# Patient Record
Sex: FEMALE | Race: WHITE | NOT HISPANIC OR LATINO | Employment: PART TIME | URBAN - METROPOLITAN AREA
[De-identification: names, ages, dates, MRNs, and addresses within clinical notes are randomized per-mention and may not be internally consistent; named-entity substitution may affect disease eponyms.]

---

## 2017-05-22 ENCOUNTER — ALLSCRIPTS OFFICE VISIT (OUTPATIENT)
Dept: OTHER | Facility: OTHER | Age: 22
End: 2017-05-22

## 2017-05-22 ENCOUNTER — LAB REQUISITION (OUTPATIENT)
Dept: LAB | Facility: HOSPITAL | Age: 22
End: 2017-05-22
Payer: COMMERCIAL

## 2017-05-22 DIAGNOSIS — N91.5 OLIGOMENORRHEA: ICD-10-CM

## 2017-05-22 DIAGNOSIS — Z11.3 ENCOUNTER FOR SCREENING FOR INFECTIONS WITH PREDOMINANTLY SEXUAL MODE OF TRANSMISSION: ICD-10-CM

## 2017-05-22 DIAGNOSIS — Z01.419 ENCOUNTER FOR GYNECOLOGICAL EXAMINATION WITHOUT ABNORMAL FINDING: ICD-10-CM

## 2017-05-22 PROCEDURE — 87591 N.GONORRHOEAE DNA AMP PROB: CPT | Performed by: OBSTETRICS & GYNECOLOGY

## 2017-05-22 PROCEDURE — 87491 CHLMYD TRACH DNA AMP PROBE: CPT | Performed by: OBSTETRICS & GYNECOLOGY

## 2017-05-22 PROCEDURE — G0143 SCR C/V CYTO,THINLAYER,RESCR: HCPCS | Performed by: OBSTETRICS & GYNECOLOGY

## 2017-05-22 PROCEDURE — 87661 TRICHOMONAS VAGINALIS AMPLIF: CPT | Performed by: OBSTETRICS & GYNECOLOGY

## 2017-05-30 LAB
CHLAMYDIA DNA CVX QL NAA+PROBE: NORMAL
N GONORRHOEA DNA GENITAL QL NAA+PROBE: NORMAL

## 2017-06-02 LAB
LAB AP GYN PRIMARY INTERPRETATION: NORMAL
Lab: NORMAL

## 2017-06-09 LAB — MISCELLANEOUS LAB TEST RESULT: NORMAL

## 2018-01-13 VITALS
DIASTOLIC BLOOD PRESSURE: 78 MMHG | HEIGHT: 66 IN | BODY MASS INDEX: 47.09 KG/M2 | WEIGHT: 293 LBS | SYSTOLIC BLOOD PRESSURE: 120 MMHG

## 2018-10-20 ENCOUNTER — HOSPITAL ENCOUNTER (EMERGENCY)
Facility: HOSPITAL | Age: 23
Discharge: HOME | End: 2018-10-20
Attending: EMERGENCY MEDICINE | Admitting: EMERGENCY MEDICINE
Payer: COMMERCIAL

## 2018-10-20 VITALS
RESPIRATION RATE: 20 BRPM | OXYGEN SATURATION: 98 % | HEART RATE: 55 BPM | BODY MASS INDEX: 48.82 KG/M2 | TEMPERATURE: 98.6 F | HEIGHT: 65 IN | DIASTOLIC BLOOD PRESSURE: 94 MMHG | WEIGHT: 293 LBS | SYSTOLIC BLOOD PRESSURE: 142 MMHG

## 2018-10-20 DIAGNOSIS — S16.1XXA STRAIN OF NECK MUSCLE, INITIAL ENCOUNTER: Primary | ICD-10-CM

## 2018-10-20 LAB — EXT PREG TEST URINE: NEGATIVE

## 2018-10-20 PROCEDURE — 81025 URINE PREGNANCY TEST: CPT | Performed by: PHYSICIAN ASSISTANT

## 2018-10-20 PROCEDURE — 99283 EMERGENCY DEPT VISIT LOW MDM: CPT

## 2018-10-20 RX ORDER — METHOCARBAMOL 500 MG/1
TABLET, FILM COATED ORAL
Qty: 20 TABLET | Refills: 0 | Status: SHIPPED | OUTPATIENT
Start: 2018-10-20 | End: 2019-10-24 | Stop reason: ALTCHOICE

## 2018-10-20 RX ORDER — METHOCARBAMOL 500 MG/1
750 TABLET, FILM COATED ORAL ONCE
Status: COMPLETED | OUTPATIENT
Start: 2018-10-20 | End: 2018-10-20

## 2018-10-20 RX ORDER — NAPROXEN 500 MG/1
500 TABLET ORAL 2 TIMES DAILY WITH MEALS
Qty: 20 TABLET | Refills: 0 | Status: SHIPPED | OUTPATIENT
Start: 2018-10-20 | End: 2019-10-24 | Stop reason: ALTCHOICE

## 2018-10-20 RX ORDER — LEVOTHYROXINE SODIUM 0.12 MG/1
TABLET ORAL
COMMUNITY
End: 2020-08-08

## 2018-10-20 RX ORDER — LIDOCAINE 50 MG/G
1 PATCH TOPICAL ONCE
Status: DISCONTINUED | OUTPATIENT
Start: 2018-10-20 | End: 2018-10-20 | Stop reason: HOSPADM

## 2018-10-20 RX ORDER — NAPROXEN 250 MG/1
500 TABLET ORAL ONCE
Status: COMPLETED | OUTPATIENT
Start: 2018-10-20 | End: 2018-10-20

## 2018-10-20 RX ADMIN — METHOCARBAMOL 750 MG: 500 TABLET ORAL at 14:07

## 2018-10-20 RX ADMIN — LIDOCAINE 1 PATCH: 50 PATCH TOPICAL at 14:05

## 2018-10-20 RX ADMIN — NAPROXEN 500 MG: 250 TABLET ORAL at 14:05

## 2018-10-20 NOTE — DISCHARGE INSTRUCTIONS
Cervical Strain   WHAT YOU NEED TO KNOW:   A cervical strain is a stretched or torn muscle or tendon in your neck  Tendons are strong tissues that connect muscles to bones  Common causes of cervical strains include a car accident, a fall, or a sports injury  DISCHARGE INSTRUCTIONS:   Return to the emergency department if:   · You have pain or numbness from your shoulder down to your hand  · You have problems with your vision, hearing, or balance  · You feel confused or cannot concentrate  · You have problems with movement and strength  Contact your healthcare provider if:   · You have increased swelling or pain in your neck  · You have questions or concerns about your condition or care  Medicines: You may need any of the following:  · Acetaminophen  decreases pain and fever  It is available without a doctor's order  Ask how much to take and how often to take it  Follow directions  Read the labels of all other medicines you are using to see if they also contain acetaminophen, or ask your doctor or pharmacist  Acetaminophen can cause liver damage if not taken correctly  Do not use more than 4 grams (4,000 milligrams) total of acetaminophen in one day  · NSAIDs , such as ibuprofen, help decrease swelling, pain, and fever  This medicine is available with or without a doctor's order  NSAIDs can cause stomach bleeding or kidney problems in certain people  If you take blood thinner medicine, always ask your healthcare provider if NSAIDs are safe for you  Always read the medicine label and follow directions  · Muscle relaxers  help decrease pain and muscle spasms  · Prescription pain medicine  may be given  Ask your healthcare provider how to take this medicine safely  Some prescription pain medicines contain acetaminophen  Do not take other medicines that contain acetaminophen without talking to your healthcare provider  Too much acetaminophen may cause liver damage   Prescription pain medicine may cause constipation  Ask your healthcare provider how to prevent or treat constipation  · Take your medicine as directed  Contact your healthcare provider if you think your medicine is not helping or if you have side effects  Tell him or her if you are allergic to any medicine  Keep a list of the medicines, vitamins, and herbs you take  Include the amounts, and when and why you take them  Bring the list or the pill bottles to follow-up visits  Carry your medicine list with you in case of an emergency  Manage your symptoms:   · Apply heat  on your neck for 15 to 20 minutes, 4 to 6 times a day or as directed  Heat helps decrease pain, stiffness, and muscle spasms  · Begin gentle neck exercises  as soon as you can move your neck without pain  Exercises will help decrease stiffness and improve the strength and movement of your neck  Ask your healthcare provider what kind of exercises you should do  · Gradually return to your usual activities as directed  Stop if you have pain  Avoid activities that can cause more damage to your neck, such as heavy lifting or strenuous exercise  · Sleep without a pillow  to help decrease pain  Instead, roll a small towel tightly and place it under your neck  · Go to physical therapy as directed  A physical therapist teaches you exercises to help improve movement and strength, and to decrease pain  Prevent neck injury:   · Drive safely  Make sure everyone in your car wears a seatbelt  A seatbelt can save your life if you are in an accident  Do not use your cell phone when you are driving  This could distract you and cause an accident  Pull over if you need to make a call or send a text message  · Wear helmets, lifejackets, and protective gear  Always wear a helmet when you ride a bike or motorcycle, go skiing, or play sports that could cause a head injury  Wear protective equipment when you play sports   Wear a lifejacket when you are on a boat or doing water sports  Follow up with your healthcare provider as directed: You may be referred to an orthopedist or physical therapies  Write down your questions so you remember to ask them during your visits  © 2017 [employer] Information is for End User's use only and may not be sold, redistributed or otherwise used for commercial purposes  All illustrations and images included in CareNotes® are the copyrighted property of [employer]  or [employer]  The above information is an  only  It is not intended as medical advice for individual conditions or treatments  Talk to your doctor, nurse or pharmacist before following any medical regimen to see if it is safe and effective for you  Muscle Strain   WHAT YOU NEED TO KNOW:   A muscle strain is a twist, pull, or tear of a muscle or tendon  A tendon is a strong elastic tissue that connects a muscle to a bone  Signs of a strained muscle include bruising and swelling over the area, pain with movement, and loss of strength  DISCHARGE INSTRUCTIONS:   Return to the emergency department if:   · You suddenly cannot feel or move your injured muscle  Contact your healthcare provider if:   · Your pain and swelling worsen or do not go away  · You have questions or concerns about your condition or care  Medicines:   · NSAIDs  help decrease swelling and pain or fever  This medicine is available with or without a doctor's order  NSAIDs can cause stomach bleeding or kidney problems in certain people  If you take blood thinner medicine, always ask your healthcare provider if NSAIDs are safe for you  Always read the medicine label and follow directions  · Muscle relaxers  help decrease pain and muscle spasms  · Take your medicine as directed  Contact your healthcare provider if you think your medicine is not helping or if you have side effects  Tell him of her if you are allergic to any medicine   Keep a list of the medicines, vitamins, and herbs you take  Include the amounts, and when and why you take them  Bring the list or the pill bottles to follow-up visits  Carry your medicine list with you in case of an emergency  Follow up with your healthcare provider as directed: Your healthcare provider may suggest that you have a follow-up visit before you go back to your usual activity  Write down your questions so you remember to ask them during your visits  Self-care:   · 3 to 7 days after the injury:  Use Rest, Ice, Compression, and Elevation (RICE) to help stop bruising and decrease pain and swelling  ¨ Rest:  Rest your muscle to allow your injury to heal  When the pain decreases, begin normal, slow movements  For mild and moderate muscle strains, you should rest your muscles for about 2 days  However, if you have a severe muscle strain, you should rest for 10 to 14 days  You may need to use crutches to walk if your muscle strain is in your legs or lower body  ¨ Ice:  Put an ice pack on the injured area  Put a towel between the ice pack and your skin  Do not put the ice pack directly on your skin  You can use a package of frozen peas instead of an ice pack  ¨ Compression:  You may need to wrap an elastic bandage around the area to decrease swelling  It should be tight enough for you to feel support  Do not wrap it too tightly  ¨ Elevation:  Keep the injured muscle raised above your heart if possible  For example if you have a strain of your lower leg muscle, lie down and prop your leg up on pillows  This helps decrease pain and swelling  · 3 to 21 days after the injury:  Start to slowly and regularly exercise your muscle  This will help it heal  If you feel pain, decrease how hard you are exercising  · 1 to 6 weeks after the injury:  Stretch the injured muscle  Hold the stretch for about 30 seconds  Do this 4 times a day  You may stretch the muscle until you feel a slight pull   Stop stretching if you feel pain  · 2 weeks to 6 months after the injury:  The goal of this phase is to return to the activity you were doing before the injury happened, without hurting the muscle again  · 3 weeks to 6 months after the injury:  Keep stretching and strengthening your muscles to avoid injury  Slowly increase the time and distance that you exercise  You may have signs and symptoms of muscle strain 6 months after the injury, even if you do things to help it heal  In this case, you may need surgery on the muscle  © 2017 [employer]  Information is for End User's use only and may not be sold, redistributed or otherwise used for commercial purposes  All illustrations and images included in CareNotes® are the copyrighted property of [employer]  or [employer]  The above information is an  only  It is not intended as medical advice for individual conditions or treatments  Talk to your doctor, nurse or pharmacist before following any medical regimen to see if it is safe and effective for you

## 2018-10-20 NOTE — ED PROVIDER NOTES
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 Taking?   levothyroxine 125 mcg tablet   Yes No   Sig: Take by mouth      Facility-Administered Medications: None       Past Medical History:   Diagnosis Date    Disease of thyroid gland        History reviewed  No pertinent surgical history  History reviewed  No pertinent family history  I have reviewed and agree with the history as documented  Social History   Substance Use Topics    Smoking status: Never Smoker    Smokeless tobacco: Never Used    Alcohol use No        Review of Systems   Constitutional: Negative for chills and fever  HENT: Negative for congestion, rhinorrhea, sinus pain and sore throat  Respiratory: Negative for cough, shortness of breath and wheezing  Cardiovascular: Negative for chest pain  Gastrointestinal: Negative for abdominal pain, diarrhea, nausea and vomiting  Musculoskeletal: Positive for back pain and neck pain  Negative for arthralgias and myalgias  Neurological: Negative for dizziness and headaches  All other systems reviewed and are negative  Physical Exam  Physical Exam   Constitutional: She is oriented to person, place, and time  She appears well-developed and well-nourished  No distress  HENT:   Head: Normocephalic and atraumatic  Right Ear: Hearing, tympanic membrane, external ear and ear canal normal    Left Ear: Hearing, tympanic membrane, external ear and ear canal normal    Nose: Nose normal  No mucosal edema or rhinorrhea  Right sinus exhibits no maxillary sinus tenderness  Left sinus exhibits no maxillary sinus tenderness  Mouth/Throat: Uvula is midline, oropharynx is clear and moist and mucous membranes are normal  No oropharyngeal exudate  Eyes: Pupils are equal, round, and reactive to light  Conjunctivae, EOM and lids are normal    Neck: Normal range of motion  Neck supple  Muscular tenderness present  No spinous process tenderness present  No neck rigidity  No edema, no erythema and normal range of motion present  No Brudzinski's sign noted  No cervical mid-line TTP, +left cervical paraspinal & trapezius muscle TTP   Cardiovascular: Normal rate, regular rhythm and normal heart sounds  Pulmonary/Chest: Effort normal and breath sounds normal    Abdominal: Soft  Normal appearance and bowel sounds are normal  There is no tenderness  Musculoskeletal:   Normal gait and station  Non-focal with FROM upper, lower extremities, neck, chest and back   Lymphadenopathy:     She has no cervical adenopathy  Neurological: She is alert and oriented to person, place, and time  Appropriate speech and behavior   Non-focal  No sensory deficits noted, no motor deficits noted       Vital Signs  ED Triage Vitals   Temperature Pulse Respirations Blood Pressure SpO2   10/20/18 1344 10/20/18 1326 10/20/18 1326 10/20/18 1326 10/20/18 1326   98 6 °F (37 °C) 63 18 (!) 197/122 97 %      Temp src Heart Rate Source Patient Position - Orthostatic VS BP Location FiO2 (%)   -- 10/20/18 1326 -- -- --    Monitor         Pain Score       --                  Vitals:    10/20/18 1326 10/20/18 1357   BP: (!) 197/122 142/94   Pulse: 63 55       Visual Acuity      ED Medications  Medications   lidocaine (LIDODERM) 5 % patch 1 patch (1 patch Topical Medication Applied 10/20/18 1405)   naproxen (NAPROSYN) tablet 500 mg (500 mg Oral Given 10/20/18 1405)   methocarbamol (ROBAXIN) tablet 750 mg (750 mg Oral Given 10/20/18 1407)       Diagnostic Studies  Results Reviewed     Procedure Component Value Units Date/Time    POCT pregnancy, urine [[id number]]  (Normal) Resulted:  10/20/18 1406    Lab Status:  Final result Updated:  10/20/18 1406     EXT PREG TEST UR (Ref: Negative) negative                 No orders to display              Procedures  Procedures       Phone Contacts  ED Phone Contact    ED Course                               MDM  CritCare Time    Disposition  Final diagnoses:   Strain of neck muscle, initial encounter     Time reflects when diagnosis was documented in both MDM as applicable and the Disposition within this note     Time User Action Codes Description Comment    10/20/2018  2:02 PM Kathryn Wilsoner  1XXA] Strain of neck muscle, initial encounter       ED Disposition     ED Disposition Condition Comment    Discharge  Sean Born discharge to home/self care  Condition at discharge: Good        Follow-up Information     Follow up With Specialties Details Why Contact Delicia Christian DO General Practice In 1 week  [address]  [facility] [address]  [phone]            Patient's Medications   Discharge Prescriptions    METHOCARBAMOL (ROBAXIN) 500 MG TABLET    Take 1-2 tablets twice daily as needed for muscle spasm  May cause sedation  Do not drive while taking  Start Date: 10/20/2018End Date: --       Order Dose: --       Quantity: 20 tablet    Refills: 0    NAPROXEN (NAPROSYN) 500 MG TABLET    Take 1 tablet (500 mg total) by mouth 2 (two) times a day with meals       Start Date: 10/20/2018End Date: --       Order Dose: 500 mg       Quantity: 20 tablet    Refills: 0     No discharge procedures on file      ED Provider  Electronically Signed by           Modesta Gore PA-C  10/20/18 7067

## 2019-01-01 ENCOUNTER — APPOINTMENT (EMERGENCY)
Dept: RADIOLOGY | Facility: HOSPITAL | Age: 24
End: 2019-01-01
Payer: COMMERCIAL

## 2019-01-01 ENCOUNTER — HOSPITAL ENCOUNTER (EMERGENCY)
Facility: HOSPITAL | Age: 24
Discharge: HOME | End: 2019-01-01
Attending: EMERGENCY MEDICINE | Admitting: EMERGENCY MEDICINE
Payer: COMMERCIAL

## 2019-01-01 VITALS
HEIGHT: 65 IN | TEMPERATURE: 98.9 F | OXYGEN SATURATION: 100 % | WEIGHT: 293 LBS | BODY MASS INDEX: 48.82 KG/M2 | RESPIRATION RATE: 20 BRPM | SYSTOLIC BLOOD PRESSURE: 144 MMHG | HEART RATE: 99 BPM | DIASTOLIC BLOOD PRESSURE: 90 MMHG

## 2019-01-01 DIAGNOSIS — J32.9 SINUSITIS: ICD-10-CM

## 2019-01-01 DIAGNOSIS — R05.9 COUGH: ICD-10-CM

## 2019-01-01 DIAGNOSIS — J06.9 UPPER RESPIRATORY TRACT INFECTION, UNSPECIFIED TYPE: Primary | ICD-10-CM

## 2019-01-01 LAB — S PYO AG THROAT QL: NEGATIVE

## 2019-01-01 PROCEDURE — 99283 EMERGENCY DEPT VISIT LOW MDM: CPT

## 2019-01-01 PROCEDURE — 87430 STREP A AG IA: CPT | Performed by: PHYSICIAN ASSISTANT

## 2019-01-01 PROCEDURE — 87631 RESP VIRUS 3-5 TARGETS: CPT | Performed by: PHYSICIAN ASSISTANT

## 2019-01-01 PROCEDURE — 71046 X-RAY EXAM CHEST 2 VIEWS: CPT

## 2019-01-01 RX ORDER — IBUPROFEN 400 MG/1
400 TABLET ORAL EVERY 6 HOURS PRN
Qty: 20 TABLET | Refills: 0 | Status: SHIPPED | OUTPATIENT
Start: 2019-01-01 | End: 2020-08-04

## 2019-01-01 RX ORDER — GUAIFENESIN 100 MG/5ML
200 SYRUP ORAL 3 TIMES DAILY PRN
Qty: 120 ML | Refills: 0 | Status: SHIPPED | OUTPATIENT
Start: 2019-01-01 | End: 2019-01-08

## 2019-01-01 RX ORDER — OXYMETAZOLINE HYDROCHLORIDE 0.05 G/100ML
2 SPRAY NASAL ONCE
Status: COMPLETED | OUTPATIENT
Start: 2019-01-01 | End: 2019-01-01

## 2019-01-01 RX ORDER — IBUPROFEN 400 MG/1
400 TABLET ORAL ONCE
Status: COMPLETED | OUTPATIENT
Start: 2019-01-01 | End: 2019-01-01

## 2019-01-01 RX ADMIN — IBUPROFEN 400 MG: 400 TABLET ORAL at 21:40

## 2019-01-01 RX ADMIN — OXYMETAZOLINE HYDROCHLORIDE 2 SPRAY: 0.05 SPRAY NASAL at 22:47

## 2019-01-02 LAB
FLUAV AG SPEC QL: ABNORMAL
FLUBV AG SPEC QL: ABNORMAL
RSV B RNA SPEC QL NAA+PROBE: DETECTED

## 2019-01-02 NOTE — ED PROVIDER NOTES
History  Chief Complaint   Patient presents with    Flu Symptoms     Patient reports headaches, cough, fatigue, dizziness, fevers, for the past several days  The patient is a 40-year-old female presents emergency department with worsening intermittent hacking productive cough with yellow sputum  Patient states that sputum does not contain blood  Patient has associated symptoms of myalgias and frontal, maxillary congestion, and subjective fevers  Patient states that she was recently seen at an urgent care on Monday and was diagnosed with upper respiratory infection and discharged home care with promethazine and albuterol inhaler  Patient states that she works at [employer] and her coworkers are experiencing symptomatology similar that of her current presentation  Patient denies palliative in provocative factors  Patient denies ineffective treatment  Patient denies vomiting  Patient states she has mild nausea  Patient denies diarrhea, constipation, and urinary symptoms  Patient denies new contact with animals, plants, commercial, and industrial products  Patient affirms sick contacts with no recent travel  Patient denies headache, tinnitus, vision changes, meningeal, and vertiginous symptoms  Patient appetite below baseline  Patient states that she consumes copious amounts of water daily  Patient denies chest pain, shortness of breath, and abdominal pain  Patient is not in acute distress  Patient is compliant for physical examination  Patient has bilateral frontal and maxillary tenderness with bilateral nasal congestion  Patient mucous membranes moist with clear and patent oropharynx  Patient lung fields clear bilaterally with no wheezes, rales, or rhonchi  Patient benign neuro muscular exam     Will perform rapid flu and rapid strep  Will perform chest x-ray    Will deliver Afrin in the ED    Differential diagnosis covers but is not limited to:  Pneumonia  Bronchitis  Seasonal allergies  Influenza  Sinusitis    History provided by:  Patient   used: No    Cough   Cough characteristics:  Hacking  Sputum characteristics:  Yellow  Severity:  Mild  Onset quality:  Gradual  Duration:  4 days  Timing:  Intermittent  Progression:  Worsening  Smoker: no    Context: sick contacts    Context: not animal exposure, not exposure to allergens, not upper respiratory infection, not weather changes and not with activity    Relieved by:  Cough suppressants  Worsened by:  Lying down  Ineffective treatments:  None tried  Associated symptoms: chills, fever, myalgias and sinus congestion    Associated symptoms: no chest pain, no diaphoresis, no ear fullness, no ear pain, no eye discharge, no headaches, no rash, no rhinorrhea, no shortness of breath, no sore throat, no weight loss and no wheezing    Fever:     Duration:  4 days    Timing:  Intermittent    Temp source:  Subjective    Progression:  Worsening  Myalgias:     Location:  Generalized    Severity:  Mild    Onset quality:  Gradual    Duration:  4 days    Timing:  Intermittent    Progression:  Unchanged  Risk factors: recent infection    Risk factors: no chemical exposure and no recent travel        Prior to Admission Medications   Prescriptions Last Dose Informant Patient Reported? Taking?   levothyroxine 125 mcg tablet   Yes No   Sig: Take by mouth   methocarbamol (ROBAXIN) 500 mg tablet   No No   Sig: Take 1-2 tablets twice daily as needed for muscle spasm  May cause sedation  Do not drive while taking  naproxen (NAPROSYN) 500 mg tablet   No No   Sig: Take 1 tablet (500 mg total) by mouth 2 (two) times a day with meals      Facility-Administered Medications: None       Past Medical History:   Diagnosis Date    Disease of thyroid gland        History reviewed  No pertinent surgical history  History reviewed  No pertinent family history  I have reviewed and agree with the history as documented      Social History   Substance Use Topics    Smoking status: Never Smoker    Smokeless tobacco: Never Used    Alcohol use No        Review of Systems   Constitutional: Positive for chills and fever  Negative for diaphoresis and weight loss  HENT: Negative for ear pain, rhinorrhea and sore throat  Eyes: Negative for discharge  Respiratory: Positive for cough  Negative for chest tightness, shortness of breath and wheezing  Cardiovascular: Negative for chest pain and palpitations  Gastrointestinal: Negative for abdominal pain, constipation, diarrhea, nausea and vomiting  Genitourinary: Negative for difficulty urinating, dysuria and frequency  Musculoskeletal: Positive for myalgias  Negative for back pain and gait problem  Skin: Negative for pallor and rash  Allergic/Immunologic: Negative for environmental allergies and food allergies  Neurological: Negative for dizziness and headaches  Psychiatric/Behavioral: Negative for confusion  All other systems reviewed and are negative  Physical Exam  Physical Exam   Constitutional: She is oriented to person, place, and time  She appears well-developed and well-nourished  She is active and cooperative  Non-toxic appearance  She does not have a sickly appearance  She does not appear ill  No distress  HENT:   Head: Normocephalic and atraumatic  Right Ear: Hearing and external ear normal  No drainage, swelling or tenderness  No mastoid tenderness  No decreased hearing is noted  Left Ear: Hearing and external ear normal  No drainage, swelling or tenderness  No mastoid tenderness  No decreased hearing is noted  Nose: Nose normal  Right sinus exhibits no maxillary sinus tenderness and no frontal sinus tenderness  Left sinus exhibits no maxillary sinus tenderness and no frontal sinus tenderness     Mouth/Throat: Uvula is midline, oropharynx is clear and moist and mucous membranes are normal    Patient bilateral ear canal completely occluded with yellow cerumen   Eyes: Pupils are equal, round, and reactive to light  Conjunctivae, EOM and lids are normal  Right eye exhibits no discharge  Left eye exhibits no discharge  Neck: Trachea normal, normal range of motion, full passive range of motion without pain and phonation normal  Neck supple  No JVD present  No tracheal tenderness, no spinous process tenderness and no muscular tenderness present  No neck rigidity  No tracheal deviation and normal range of motion present  Cardiovascular: Normal rate, regular rhythm, normal heart sounds, intact distal pulses and normal pulses  Pulses:       Radial pulses are 2+ on the right side, and 2+ on the left side  Posterior tibial pulses are 2+ on the right side, and 2+ on the left side  Pulmonary/Chest: Effort normal and breath sounds normal  No stridor  She has no decreased breath sounds  She has no wheezes  She has no rhonchi  She has no rales  She exhibits no tenderness, no bony tenderness and no crepitus  Abdominal: Soft  Normal appearance and bowel sounds are normal  She exhibits no distension  There is no tenderness  There is no rigidity, no rebound, no guarding and no CVA tenderness  Musculoskeletal: Normal range of motion  Passive ROM intact  Upper and lower extremity 5/5 bilaterally  Neurovascularly intact  No grinding or clicking of joints     Lymphadenopathy:        Head (right side): No submental, no submandibular, no tonsillar, no preauricular, no posterior auricular and no occipital adenopathy present  Head (left side): No submental, no submandibular, no tonsillar, no preauricular, no posterior auricular and no occipital adenopathy present  She has no cervical adenopathy  Right cervical: No superficial cervical, no deep cervical and no posterior cervical adenopathy present  Left cervical: No superficial cervical, no deep cervical and no posterior cervical adenopathy present  Neurological: She is alert and oriented to person, place, and time  She has normal strength and normal reflexes  No sensory deficit  GCS eye subscore is 4  GCS verbal subscore is 5  GCS motor subscore is 6  Reflex Scores:       Patellar reflexes are 2+ on the right side and 2+ on the left side  Skin: Skin is warm and intact  Capillary refill takes less than 2 seconds  She is not diaphoretic  Psychiatric: She has a normal mood and affect  Her speech is normal and behavior is normal  Judgment and thought content normal  Cognition and memory are normal    Vitals reviewed  Vital Signs  ED Triage Vitals [01/01/19 2107]   Temperature Pulse Respirations Blood Pressure SpO2   98 9 °F (37 2 °C) (!) 109 18 149/97 99 %      Temp Source Heart Rate Source Patient Position - Orthostatic VS BP Location FiO2 (%)   Oral Monitor Sitting Left arm --      Pain Score       7           Vitals:    01/01/19 2107 01/01/19 2246   BP: 149/97 144/90   Pulse: (!) 109 99   Patient Position - Orthostatic VS: Sitting Sitting       Visual Acuity      ED Medications  Medications   ibuprofen (MOTRIN) tablet 400 mg (400 mg Oral Given 1/1/19 2140)   oxymetazoline (AFRIN) 0 05 % nasal spray 2 spray (2 sprays Each Nare Given 1/1/19 2247)       Diagnostic Studies  Results Reviewed     Procedure Component Value Units Date/Time    Influenza A/B and RSV by PCR [[id number]]  (Abnormal) Collected:  01/01/19 2145    Lab Status:  Final result Specimen:  Nasopharyngeal from Nasopharyngeal Swab Updated:  01/02/19 1602     INFLU A PCR None Detected     INFLU B PCR None Detected     RSV PCR Detected (A)    Rapid Strep A Screen Only, Adults [[id number]]  (Normal) Collected:  01/01/19 2145    Lab Status:  Final result Specimen:  Throat from Throat Updated:  01/01/19 2159     Rapid Strep A Screen Negative                 XR chest 2 views   Final Result by Aamir Stone MD (01/02 0845)      No acute cardiopulmonary disease              Workstation performed: [id number]                    Procedures  Procedures       Phone Contacts  ED Phone Contact    ED Course                               MDM  Number of Diagnoses or Management Options  Cough: minor  Sinusitis: minor  Upper respiratory tract infection, unspecified type: minor  Diagnosis management comments: Rapid strep negative  Rapid flu -in process  Chest xray -no acute cardiopulmonary disease  Delivered Afrin in the emergency department and patient verbalizes improvement of nasal congestion symptomatology status post medication delivery   Continue daily humidifiers  Continue daily fluids and electrolytes  Prescribed guaifenesin and Motrin and counseled patient on medication administration and side effects  Follow-up with PCP  Follow up with emergency department symptoms persist or exacerbate  Patient demonstrates verbal understanding of all clinical and imaging results, discharge instructions, and follow-up       Amount and/or Complexity of Data Reviewed  Clinical lab tests: ordered and reviewed  Tests in the radiology section of CPT®: ordered and reviewed    Risk of Complications, Morbidity, and/or Mortality  Presenting problems: low    Patient Progress  Patient progress: stable    CritCare Time    Disposition  Final diagnoses:   Upper respiratory tract infection, unspecified type   Cough   Sinusitis     Time reflects when diagnosis was documented in both MDM as applicable and the Disposition within this note     Time User Action Codes Description Comment    1/1/2019 11:07 PM Candie Regalado Add [J06 9] Upper respiratory tract infection, unspecified type     1/1/2019 11:07 PM Candie Regalado Add [R05] Cough     1/1/2019 11:08 PM Candie Regalado Add [J32 9] Sinusitis       ED Disposition     ED Disposition Condition Comment    Discharge  Sean Born discharge to home/self care      Condition at discharge: Stable        Follow-up Information     Follow up With Specialties Details Why Contact Info Additional [id number]  [facility] [place] [facility], DO General Practice Call in 2 days for further evaluation of symptoms [address]  [facility] [address]  [address] [facility] Emergency Department Emergency Medicine Go to As needed [address] [facility] [place] [address] [phone] [place] ED, [address] [place], [place], [address]          Discharge Medication List as of 1/1/2019 11:10 PM      START taking these medications    Details   guaiFENesin (ROBITUSSIN) 100 mg/5 mL syrup Take 10 mL (200 mg total) by mouth 3 (three) times a day as needed for cough for up to 7 days, Starting Tue 1/1/2019, Until Tue 1/8/2019, Print      ibuprofen (MOTRIN) 400 mg tablet Take 1 tablet (400 mg total) by mouth every 6 (six) hours as needed for headaches for up to 5 days, Starting Tue 1/1/2019, Until Sun 1/6/2019, Print         CONTINUE these medications which have NOT CHANGED    Details   levothyroxine 125 mcg tablet Take by mouth, Historical Med      methocarbamol (ROBAXIN) 500 mg tablet Take 1-2 tablets twice daily as needed for muscle spasm  May cause sedation  Do not drive while taking , Print      naproxen (NAPROSYN) 500 mg tablet Take 1 tablet (500 mg total) by mouth 2 (two) times a day with meals, Starting Sat 10/20/2018, Print           No discharge procedures on file      ED Provider  Electronically Signed by           Cl Mendoza PA-C  01/02/19 0993

## 2019-01-02 NOTE — PROGRESS NOTES
Voicemail box full not accepting new messages    If the patient calls back she needs to be notified of positive RSV

## 2019-01-02 NOTE — DISCHARGE INSTRUCTIONS
Take Tylenol and guaifenesin as indicated  Continue daily fluids and electrolytes  Continue daily humidifiers  Follow-up with PCP  Follow up with emergency department symptoms persist or exacerbate  Acute Cough   WHAT YOU NEED TO KNOW:   An acute cough can last up to 3 weeks  Common causes of an acute cough include a cold, allergies, or a lung infection  DISCHARGE INSTRUCTIONS:   Return to the emergency department if:   · You have trouble breathing or feel short of breath  · You cough up blood, or you see blood in your mucus  · You faint or feel weak or dizzy  · You have chest pain when you cough or take a deep breath  · You have new wheezing  Contact your healthcare provider if:   · You have a fever  · Your cough lasts longer than 4 weeks  · Your symptoms do not improve with treatment  · You have questions or concerns about your condition or care  Medicines:   · Medicines  may be needed to stop the cough, decrease swelling in your airways, or help open your airways  Medicine may also be given to help you cough up mucus  Ask your healthcare provider what over-the-counter medicines you can take  If you have an infection caused by bacteria, you may need antibiotics  · Take your medicine as directed  Contact your healthcare provider if you think your medicine is not helping or if you have side effects  Tell him or her if you are allergic to any medicine  Keep a list of the medicines, vitamins, and herbs you take  Include the amounts, and when and why you take them  Bring the list or the pill bottles to follow-up visits  Carry your medicine list with you in case of an emergency  Manage your symptoms:   · Do not smoke and stay away from others who smoke  Nicotine and other chemicals in cigarettes and cigars can cause lung damage and make your cough worse  Ask your healthcare provider for information if you currently smoke and need help to quit   E-cigarettes or smokeless tobacco still contain nicotine  Talk to your healthcare provider before you use these products  · Drink extra liquids as directed  Liquids will help thin and loosen mucus so you can cough it up  Liquids will also help prevent dehydration  Examples of good liquids to drink include water, fruit juice, and broth  Do not drink liquids that contain caffeine  Caffeine can increase your risk for dehydration  Ask your healthcare provider how much liquid to drink each day  · Rest as directed  Do not do activities that make your cough worse, such as exercise  · Use a humidifier or vaporizer  Use a cool mist humidifier or a vaporizer to increase air moisture in your home  This may make it easier for you to breathe and help decrease your cough  · Eat 2 to 5 mL of honey 2 times each day  Honey can help thin mucus and decrease your cough  · Use cough drops or lozenges  These can help decrease throat irritation and your cough  Follow up with your healthcare provider as directed:  Write down your questions so you remember to ask them during your visits  © 2017 [address]  Information is for End User's use only and may not be sold, redistributed or otherwise used for commercial purposes  All illustrations and images included in CareNotes® are the copyrighted property of A D A M , Inc  or Florentin White  The above information is an  only  It is not intended as medical advice for individual conditions or treatments  Talk to your doctor, nurse or pharmacist before following any medical regimen to see if it is safe and effective for you  Cold Symptoms   FamilyDoctor  org: Colds and the flu: treatment  [employer]  [place]arrita Line  2014  Available from URL: http://familydoctor  org/familydoctor/en/diseases-conditions/colds-and-the-flu/treatment html  As accessed 2015-06-03  Mindy Lane JA: Viruses   In: Diseases: A Nursing Process Approach to Excellent Care, 4th ed  [address] [place], [place], 2006, pp [phone]  Ning BLANCHARD & Dorothy CARRERO: Upper Respiratory Infection  In: Vikash FJ, ed  The 5-Minute Clinical Consult 2012, 20th ed  [address] [facility] [place], [place], 2012, [place] 2740-0702  © 2017 [facility] Information is for End User's use only and may not be sold, redistributed or otherwise used for commercial purposes  All illustrations and images included in CareNotes® are the copyrighted property of A D A [employer] , [employer]  or Florentin White  The above information is an  only  It is not intended as medical advice for individual conditions or treatments  Talk to your doctor, nurse or pharmacist before following any medical regimen to see if it is safe and effective for you

## 2019-10-02 ENCOUNTER — OFFICE VISIT (OUTPATIENT)
Dept: OBGYN CLINIC | Facility: MEDICAL CENTER | Age: 24
End: 2019-10-02
Payer: COMMERCIAL

## 2019-10-02 VITALS
DIASTOLIC BLOOD PRESSURE: 78 MMHG | BODY MASS INDEX: 48.82 KG/M2 | SYSTOLIC BLOOD PRESSURE: 140 MMHG | WEIGHT: 293 LBS | HEIGHT: 65 IN

## 2019-10-02 DIAGNOSIS — E28.2 PCOS (POLYCYSTIC OVARIAN SYNDROME): Primary | ICD-10-CM

## 2019-10-02 PROCEDURE — 99213 OFFICE O/P EST LOW 20 MIN: CPT | Performed by: PHYSICIAN ASSISTANT

## 2019-10-02 NOTE — PROGRESS NOTES
Assessment/Plan:    PCOS (polycystic ovarian syndrome)  Disc different tx methods for regulation of menses - pt will consider options  BP tends to run on higher side (no formal dx of HTN) - would rec prog only options; rec f/u with PCP to address BP  Polycystic ovary syndrome is a hormonal disorder common among women of reproductive age  Women with PCOS may have infrequent or prolonged menstrual periods or excess androgen levels  The ovaries may develop numerous small collections of fluid and fail to regularly release eggs  Denies any recent screening for HLD, DM - will order  Will also check CBC d/t description of heavy menses, notes fatigue    Will sched annual as is overdue - plans to observe cycle/menses and will notify in interim if wishes to start hormonal contra option            Diagnoses and all orders for this visit:    PCOS (polycystic ovarian syndrome)  -     Lipid panel; Future  -     HEMOGLOBIN A1C W/ EAG ESTIMATION; Future  -     CBC and differential; Future        Subjective:      Patient ID: Asia Wong is a 25 y o  female  Pt presents to discuss heavy menses x 1  Pt reports that most recent menses was heavier than usual - lasted 5-7 days but first 2 days was changing pad/tampon q2 hrs at heaviest  No h/o heavy menses; cycles have been occurring q month  Dx w/ PCOS by endo in the past - previously was skipping months between menses or would have two in one month  PMH sig for hypothyroidism - monitored frequently (was checked last month per pt); compliant w/ Synthroid     Denies any risk of STDs and declines testing today  Declines UPT    No abn d/c or pelvic pain   Condoms for contra        The following portions of the patient's history were reviewed and updated as appropriate: allergies, current medications, past family history, past medical history, past social history, past surgical history and problem list     Review of Systems   Constitutional: Positive for fatigue   Negative for appetite change and unexpected weight change  Respiratory: Negative for chest tightness and shortness of breath  Cardiovascular: Negative for chest pain, palpitations and leg swelling  Gastrointestinal: Negative for abdominal pain, constipation, diarrhea, nausea and vomiting  Genitourinary: Positive for menstrual problem  Negative for difficulty urinating, dyspareunia, pelvic pain and vaginal discharge  Musculoskeletal: Negative for arthralgias and myalgias  Neurological: Negative for dizziness, light-headedness and headaches  Psychiatric/Behavioral: Negative for dysphoric mood  The patient is not nervous/anxious  All other systems reviewed and are negative  Objective:      /78   Ht 5' 5" (1 651 m)   Wt (!) 147 kg (324 lb 6 4 oz)   LMP 09/24/2019 (Exact Date)   Breastfeeding? No   BMI 53 98 kg/m²          Physical Exam   Constitutional: She is oriented to person, place, and time  She appears well-developed and well-nourished  HENT:   Head: Normocephalic and atraumatic  Neurological: She is alert and oriented to person, place, and time  Skin: Skin is warm and dry  Psychiatric: She has a normal mood and affect  Nursing note and vitals reviewed

## 2019-10-02 NOTE — ASSESSMENT & PLAN NOTE
Disc different tx methods for regulation of menses - pt will consider options  BP tends to run on higher side (no formal dx of HTN) - would rec prog only options; rec f/u with PCP to address BP  Polycystic ovary syndrome is a hormonal disorder common among women of reproductive age  Women with PCOS may have infrequent or prolonged menstrual periods or excess androgen levels   The ovaries may develop numerous small collections of fluid and fail to regularly release eggs  Denies any recent screening for HLD, DM - will order  Will also check CBC d/t description of heavy menses, notes fatigue    Will sched annual as is overdue - plans to observe cycle/menses and will notify in interim if wishes to start hormonal contra option

## 2019-10-05 ENCOUNTER — TRANSCRIBE ORDERS (OUTPATIENT)
Dept: ADMINISTRATIVE | Facility: HOSPITAL | Age: 24
End: 2019-10-05

## 2019-10-05 ENCOUNTER — APPOINTMENT (OUTPATIENT)
Dept: LAB | Facility: MEDICAL CENTER | Age: 24
End: 2019-10-05
Payer: COMMERCIAL

## 2019-10-05 DIAGNOSIS — E03.9 ADULT HYPOTHYROIDISM: ICD-10-CM

## 2019-10-05 DIAGNOSIS — E66.8 OBESITY OF ENDOCRINE ORIGIN: ICD-10-CM

## 2019-10-05 DIAGNOSIS — E03.9 ADULT HYPOTHYROIDISM: Primary | ICD-10-CM

## 2019-10-05 DIAGNOSIS — E28.2 PCOS (POLYCYSTIC OVARIAN SYNDROME): ICD-10-CM

## 2019-10-05 DIAGNOSIS — E55.9 VITAMIN D DEFICIENCY: ICD-10-CM

## 2019-10-05 LAB
25(OH)D3 SERPL-MCNC: 16.3 NG/ML (ref 30–100)
ALBUMIN SERPL BCP-MCNC: 3.3 G/DL (ref 3.5–5)
ALP SERPL-CCNC: 76 U/L (ref 46–116)
ALT SERPL W P-5'-P-CCNC: 21 U/L (ref 12–78)
ANION GAP SERPL CALCULATED.3IONS-SCNC: 6 MMOL/L (ref 4–13)
AST SERPL W P-5'-P-CCNC: 12 U/L (ref 5–45)
BASOPHILS # BLD AUTO: 0.07 THOUSANDS/ΜL (ref 0–0.1)
BASOPHILS NFR BLD AUTO: 1 % (ref 0–1)
BILIRUB SERPL-MCNC: 0.39 MG/DL (ref 0.2–1)
BUN SERPL-MCNC: 13 MG/DL (ref 5–25)
CALCIUM SERPL-MCNC: 9 MG/DL (ref 8.3–10.1)
CHLORIDE SERPL-SCNC: 108 MMOL/L (ref 100–108)
CHOLEST SERPL-MCNC: 156 MG/DL (ref 50–200)
CO2 SERPL-SCNC: 26 MMOL/L (ref 21–32)
CREAT SERPL-MCNC: 0.84 MG/DL (ref 0.6–1.3)
EOSINOPHIL # BLD AUTO: 0.25 THOUSAND/ΜL (ref 0–0.61)
EOSINOPHIL NFR BLD AUTO: 3 % (ref 0–6)
ERYTHROCYTE [DISTWIDTH] IN BLOOD BY AUTOMATED COUNT: 13.4 % (ref 11.6–15.1)
EST. AVERAGE GLUCOSE BLD GHB EST-MCNC: 111 MG/DL
GFR SERPL CREATININE-BSD FRML MDRD: 98 ML/MIN/1.73SQ M
GLUCOSE P FAST SERPL-MCNC: 91 MG/DL (ref 65–99)
HBA1C MFR BLD: 5.5 % (ref 4.2–6.3)
HCT VFR BLD AUTO: 34.7 % (ref 34.8–46.1)
HDLC SERPL-MCNC: 49 MG/DL (ref 40–60)
HGB BLD-MCNC: 10.8 G/DL (ref 11.5–15.4)
IMM GRANULOCYTES # BLD AUTO: 0.06 THOUSAND/UL (ref 0–0.2)
IMM GRANULOCYTES NFR BLD AUTO: 1 % (ref 0–2)
LDLC SERPL CALC-MCNC: 88 MG/DL (ref 0–100)
LYMPHOCYTES # BLD AUTO: 2.77 THOUSANDS/ΜL (ref 0.6–4.47)
LYMPHOCYTES NFR BLD AUTO: 29 % (ref 14–44)
MCH RBC QN AUTO: 28.6 PG (ref 26.8–34.3)
MCHC RBC AUTO-ENTMCNC: 31.1 G/DL (ref 31.4–37.4)
MCV RBC AUTO: 92 FL (ref 82–98)
MONOCYTES # BLD AUTO: 0.5 THOUSAND/ΜL (ref 0.17–1.22)
MONOCYTES NFR BLD AUTO: 5 % (ref 4–12)
NEUTROPHILS # BLD AUTO: 6.08 THOUSANDS/ΜL (ref 1.85–7.62)
NEUTS SEG NFR BLD AUTO: 61 % (ref 43–75)
NONHDLC SERPL-MCNC: 107 MG/DL
NRBC BLD AUTO-RTO: 0 /100 WBCS
PLATELET # BLD AUTO: 337 THOUSANDS/UL (ref 149–390)
PMV BLD AUTO: 11.4 FL (ref 8.9–12.7)
POTASSIUM SERPL-SCNC: 4.3 MMOL/L (ref 3.5–5.3)
PROT SERPL-MCNC: 7.3 G/DL (ref 6.4–8.2)
RBC # BLD AUTO: 3.77 MILLION/UL (ref 3.81–5.12)
SODIUM SERPL-SCNC: 140 MMOL/L (ref 136–145)
TRIGL SERPL-MCNC: 96 MG/DL
TSH SERPL DL<=0.05 MIU/L-ACNC: 4.36 UIU/ML (ref 0.36–3.74)
WBC # BLD AUTO: 9.73 THOUSAND/UL (ref 4.31–10.16)

## 2019-10-05 PROCEDURE — 82306 VITAMIN D 25 HYDROXY: CPT

## 2019-10-05 PROCEDURE — 84443 ASSAY THYROID STIM HORMONE: CPT

## 2019-10-05 PROCEDURE — 36415 COLL VENOUS BLD VENIPUNCTURE: CPT

## 2019-10-05 PROCEDURE — 83036 HEMOGLOBIN GLYCOSYLATED A1C: CPT

## 2019-10-05 PROCEDURE — 85025 COMPLETE CBC W/AUTO DIFF WBC: CPT

## 2019-10-05 PROCEDURE — 80053 COMPREHEN METABOLIC PANEL: CPT

## 2019-10-05 PROCEDURE — 80061 LIPID PANEL: CPT

## 2019-10-09 ENCOUNTER — TELEPHONE (OUTPATIENT)
Dept: OBGYN CLINIC | Facility: CLINIC | Age: 24
End: 2019-10-09

## 2019-10-09 DIAGNOSIS — D64.9 ANEMIA, UNSPECIFIED TYPE: Primary | ICD-10-CM

## 2019-10-09 NOTE — TELEPHONE ENCOUNTER
patient contacted and advised as directed  Order placed in epic for cbc and mailed to patient along with sticky note with reminder of medication  Pt advised to take with orange juice as it will help her body better absorbe the iron  Pt declined OCP at this time stating she will discuss further at visit on 10/18 after she talks to mom about other possible options as IUD or depo

## 2019-10-09 NOTE — TELEPHONE ENCOUNTER
----- Message from Julia Joel PA-C sent at 10/9/2019  9:36 AM EDT -----  A1C ok, lipids WNL  Anemia on labs, would rec she start ferrous sulfate 325 mg 1-2 tabs daily and repeat CBC in 3 mo  In meantime - has she thought any further about contra options we discussed while she was in office ( to regulate her periods?)  thanks

## 2019-10-24 ENCOUNTER — ANNUAL EXAM (OUTPATIENT)
Dept: OBGYN CLINIC | Facility: MEDICAL CENTER | Age: 24
End: 2019-10-24
Payer: COMMERCIAL

## 2019-10-24 VITALS
DIASTOLIC BLOOD PRESSURE: 72 MMHG | SYSTOLIC BLOOD PRESSURE: 136 MMHG | HEIGHT: 65 IN | WEIGHT: 293 LBS | BODY MASS INDEX: 48.82 KG/M2

## 2019-10-24 DIAGNOSIS — E28.2 PCOS (POLYCYSTIC OVARIAN SYNDROME): ICD-10-CM

## 2019-10-24 DIAGNOSIS — Z01.419 ENCOUNTER FOR ANNUAL ROUTINE GYNECOLOGICAL EXAMINATION: Primary | ICD-10-CM

## 2019-10-24 PROCEDURE — S0612 ANNUAL GYNECOLOGICAL EXAMINA: HCPCS | Performed by: STUDENT IN AN ORGANIZED HEALTH CARE EDUCATION/TRAINING PROGRAM

## 2019-10-24 NOTE — PROGRESS NOTES
Assessment/Plan:    24 yo G0 with PCOS here for annual exam  Doing well  Pt counseled extensively about the benefits of hormonal suppression with PCOS to prevent endometrial hyperplasia/carcinoma  Discussed OCP vs  IUD  Pt interested in IUD - given info on kyleena/mirena  Pap smear: Due 2020  Follow up in 1 yr or PRN  Encounter for annual routine gynecological examination    PCOS (polycystic ovarian syndrome)        Subjective:      Patient ID: Jamil Penn is a 25 y o  female  24 yo G0 with LMP of 9/24 currently using condoms for contraception presents for annual exam  She does have a history of PCOS which she reports was diagnosed around 11 yo by bloodwork/ultrasound  She has never been on hormonal suppression  She did have irregular periods up until 1 year ago  Over the past year periods have been regular  No concern for STDs and declines testing today  Pap smear: 2017 negative cytology      The following portions of the patient's history were reviewed and updated as appropriate: allergies, current medications, past family history, past medical history, past social history, past surgical history and problem list     Review of Systems   Constitutional: Negative  HENT: Negative  Eyes: Negative  Respiratory: Negative  Cardiovascular: Negative  Gastrointestinal: Negative  Endocrine: Negative  Genitourinary: Negative for dyspareunia, dysuria, frequency, menstrual problem, pelvic pain, vaginal discharge and vaginal pain  Musculoskeletal: Negative  Skin: Negative  Allergic/Immunologic: Negative  Neurological: Negative  Hematological: Negative  Psychiatric/Behavioral: Negative  Objective:      /72 (BP Location: Right arm, Patient Position: Sitting, Cuff Size: Standard)   Ht 5' 5" (1 651 m)   Wt (!) 147 kg (324 lb)   LMP 09/24/2019 (Exact Date)   BMI 53 92 kg/m²          Physical Exam   Constitutional: She is oriented to person, place, and time  She appears well-nourished  Neck: Normal range of motion  Cardiovascular: Normal rate  Pulmonary/Chest: Effort normal  Right breast exhibits no mass, no nipple discharge, no skin change and no tenderness  Left breast exhibits no mass, no nipple discharge, no skin change and no tenderness  Breasts are symmetrical    Abdominal: Soft  Genitourinary: Vagina normal and uterus normal  Uterus is not enlarged and not fixed  Right adnexum displays no mass  Left adnexum displays no mass  No signs of injury around the vagina  Neurological: She is alert and oriented to person, place, and time  Skin: Skin is warm and dry  Psychiatric: She has a normal mood and affect  Vitals reviewed

## 2020-02-07 ENCOUNTER — TELEPHONE (OUTPATIENT)
Dept: OBGYN CLINIC | Facility: CLINIC | Age: 25
End: 2020-02-07

## 2020-02-07 NOTE — TELEPHONE ENCOUNTER
Over due bin, cbc due- contacted pt # [phone] vm- per hippa communication consent on file- lmom advising friendly reminder due and mailing copy of order incase she needs it to home address on file

## 2020-08-04 ENCOUNTER — APPOINTMENT (OUTPATIENT)
Dept: RADIOLOGY | Facility: CLINIC | Age: 25
End: 2020-08-04
Payer: COMMERCIAL

## 2020-08-04 ENCOUNTER — OFFICE VISIT (OUTPATIENT)
Dept: FAMILY MEDICINE CLINIC | Facility: CLINIC | Age: 25
End: 2020-08-04
Payer: COMMERCIAL

## 2020-08-04 VITALS
HEART RATE: 67 BPM | TEMPERATURE: 97.2 F | HEIGHT: 65 IN | DIASTOLIC BLOOD PRESSURE: 90 MMHG | SYSTOLIC BLOOD PRESSURE: 112 MMHG | RESPIRATION RATE: 18 BRPM | OXYGEN SATURATION: 97 % | BODY MASS INDEX: 48.82 KG/M2 | WEIGHT: 293 LBS

## 2020-08-04 DIAGNOSIS — E03.8 HYPOTHYROIDISM DUE TO HASHIMOTO'S THYROIDITIS: Primary | ICD-10-CM

## 2020-08-04 DIAGNOSIS — D50.8 IRON DEFICIENCY ANEMIA SECONDARY TO INADEQUATE DIETARY IRON INTAKE: ICD-10-CM

## 2020-08-04 DIAGNOSIS — Z13.220 LIPID SCREENING: ICD-10-CM

## 2020-08-04 DIAGNOSIS — E66.01 MORBID OBESITY WITH BMI OF 50.0-59.9, ADULT (HCC): ICD-10-CM

## 2020-08-04 DIAGNOSIS — Z86.19: ICD-10-CM

## 2020-08-04 DIAGNOSIS — E65 PANNUS, ABDOMINAL: ICD-10-CM

## 2020-08-04 DIAGNOSIS — R07.89 OTHER CHEST PAIN: ICD-10-CM

## 2020-08-04 DIAGNOSIS — R00.2 HEART PALPITATIONS: ICD-10-CM

## 2020-08-04 DIAGNOSIS — N93.9 ABNORMAL BLEEDING IN MENSTRUAL CYCLE: ICD-10-CM

## 2020-08-04 DIAGNOSIS — E06.3 HYPOTHYROIDISM DUE TO HASHIMOTO'S THYROIDITIS: Primary | ICD-10-CM

## 2020-08-04 PROBLEM — N92.6 ABNORMAL BLEEDING IN MENSTRUAL CYCLE: Status: ACTIVE | Noted: 2020-08-04

## 2020-08-04 PROCEDURE — 3008F BODY MASS INDEX DOCD: CPT | Performed by: NURSE PRACTITIONER

## 2020-08-04 PROCEDURE — 3725F SCREEN DEPRESSION PERFORMED: CPT | Performed by: NURSE PRACTITIONER

## 2020-08-04 PROCEDURE — 1036F TOBACCO NON-USER: CPT | Performed by: NURSE PRACTITIONER

## 2020-08-04 PROCEDURE — 99204 OFFICE O/P NEW MOD 45 MIN: CPT | Performed by: NURSE PRACTITIONER

## 2020-08-04 PROCEDURE — 71046 X-RAY EXAM CHEST 2 VIEWS: CPT

## 2020-08-04 NOTE — PATIENT INSTRUCTIONS
Establish care visit today  Heart palpitations with some chest pain- obtain thyroid labs  Obtain chest xray  Hypothyroid- needs tsh checked  Pannus with frequent skin infections- refer to Plastic surgery  Morbid obesity- refer to Weight management  Abnormal menstrual bleeding with prolonged cycles- obtain hormone labs and advised patient to discuss with her GYN  Iron def anemia- vegan diet  Has not taken iron supplements  Will need current labs and then I will decide on plan  Will obtain full lab panel  Our office will call with results  Follow up in office in 3 months  Obesity   AMBULATORY CARE:   Obesity  is when your body mass index (BMI) is greater than 30  Your healthcare provider will use your height and weight to measure your BMI  The risks of obesity include  many health problems, such as injuries or physical disability  You may need tests to check for the following:  · Diabetes     · High blood pressure or high cholesterol     · Heart disease     · Gallbladder or liver disease     · Cancer of the colon, breast, prostate, liver, or kidney     · Sleep apnea     · Arthritis or gout  Seek care immediately if:   · You have a severe headache, confusion, or difficulty speaking  · You have weakness on one side of your body  · You have chest pain, sweating, or shortness of breath  Contact your healthcare provider if:   · You have symptoms of gallbladder or liver disease, such as pain in your upper abdomen  · You have knee or hip pain and discomfort while walking  · You have symptoms of diabetes, such as intense hunger and thirst, and frequent urination  · You have symptoms of sleep apnea, such as snoring or daytime sleepiness  · You have questions or concerns about your condition or care  Treatment for obesity  focuses on helping you lose weight to improve your health  Even a small decrease in BMI can reduce the risk for many health problems   Your healthcare provider will help you set a weight-loss goal   · Lifestyle changes  are the first step in treating obesity  These include making healthy food choices and getting regular physical activity  Your healthcare provider may suggest a weight-loss program that involves coaching, education, and therapy  · Medicine  may help you lose weight when it is used with a healthy diet and physical activity  · Surgery  can help you lose weight if you are very obese and have other health problems  There are several types of weight-loss surgery  Ask your healthcare provider for more information  Be successful losing weight:   · Set small, realistic goals  An example of a small goal is to walk for 20 minutes 5 days a week  Anther goal is to lose 5% of your body weight  · Tell friends, family members, and coworkers about your goals  and ask for their support  Ask a friend to lose weight with you, or join a weight-loss support group  · Identify foods or triggers that may cause you to overeat , and find ways to avoid them  Remove tempting high-calorie foods from your home and workplace  Place a bowl of fresh fruit on your kitchen counter  If stress causes you to eat, then find other ways to cope with stress  · Keep a diary to track what you eat and drink  Also write down how many minutes of physical activity you do each day  Weigh yourself once a week and record it in your diary  Eating changes: You will need to eat 500 to 1,000 fewer calories each day than you currently eat to lose 1 to 2 pounds a week  The following changes will help you cut calories:  · Eat smaller portions  Use small plates, no larger than 9 inches in diameter  Fill your plate half full of fruits and vegetables  Measure your food using measuring cups until you know what a serving size looks like  · Eat 3 meals and 1 or 2 snacks each day  Plan your meals in advance  [place] and eat at home most of the time  Eat slowly       · Eat fruits and vegetables at every meal   They are low in calories and high in fiber, which makes you feel full  Do not add butter, margarine, or cream sauce to vegetables  Use herbs to season steamed vegetables  · Eat less fat and fewer fried foods  Eat more baked or grilled chicken and fish  These protein sources are lower in calories and fat than red meat  Limit fast food  Dress your salads with olive oil and vinegar instead of bottled dressing  · Limit the amount of sugar you eat  Do not drink sugary beverages  Limit alcohol  Activity changes:  Physical activity is good for your body in many ways  It helps you burn calories and build strong muscles  It decreases stress and depression, and improves your mood  It can also help you sleep better  Talk to your healthcare provider before you begin an exercise program   · Exercise for at least 30 minutes 5 days a week  Start slowly  Set aside time each day for physical activity that you enjoy and that is convenient for you  It is best to do both weight training and an activity that increases your heart rate, such as walking, bicycling, or swimming  · Find ways to be more active  Do yard work and housecleaning  Walk up the stairs instead of using elevators  Spend your leisure time going to events that require walking, such as outdoor festivals or fairs  This extra physical activity can help you lose weight and keep it off  Follow up with your healthcare provider as directed: You may need to meet with a dietitian  Write down your questions so you remember to ask them during your visits  © 2017 [employer] Information is for End User's use only and may not be sold, redistributed or otherwise used for commercial purposes  All illustrations and images included in CareNotes® are the copyrighted property of [employer] , [employer]  or Florentin White  The above information is an  only  It is not intended as medical advice for individual conditions or treatments   Talk to your doctor, nurse or pharmacist before following any medical regimen to see if it is safe and effective for you  Low Fat Diet   AMBULATORY CARE:   A low-fat diet  is an eating plan that is low in total fat, unhealthy fat, and cholesterol  You may need to follow a low-fat diet if you have trouble digesting or absorbing fat  You may also need to follow this diet if you have high cholesterol  You can also lower your cholesterol by increasing the amount of fiber in your diet  Soluble fiber is a type of fiber that helps to decrease cholesterol levels  Different types of fat in food:   · Limit unhealthy fats  A diet that is high in cholesterol, saturated fat, and trans fat may cause unhealthy cholesterol levels  Unhealthy cholesterol levels increase your risk of heart disease  ¨ Cholesterol:  Limit intake of cholesterol to less than 200 mg per day  Cholesterol is found in meat, eggs, and dairy  ¨ Saturated fat:  Limit saturated fat to less than 7% of your total daily calories  Ask your dietitian how many calories you need each day  Saturated fat is found in butter, cheese, ice cream, whole milk, and palm oil  Saturated fat is also found in meat, such as beef, pork, chicken skin, and processed meats  Processed meats include sausage, hot dogs, and bologna  ¨ Trans fat:  Avoid trans fat as much as possible  Trans fat is used in fried and baked foods  Foods that say trans fat free on the label may still have up to 0 5 grams of trans fat per serving  · Include healthy fats  Replace foods that are high in saturated and trans fat with foods high in healthy fats  This may help to decrease high cholesterol levels  ¨ Monounsaturated fats: These are found in avocados, nuts, and vegetable oils, such as olive, canola, and sunflower oil  ¨ Polyunsaturated fats: These can be found in vegetable oils, such as soybean or corn oil  Omega-3 fats can help to decrease the risk of heart disease   Omega-3 fats are found in fish, such as salmon, herring, trout, and tuna  Omega-3 fats can also be found in plant foods, such as walnuts, flaxseed, soybeans, and canola oil    Foods to limit or avoid:   · Grains:      ¨ Snacks that are made with partially hydrogenated oils, such as chips, regular crackers, and butter-flavored popcorn    ¨ High-fat baked goods, such as biscuits, croissants, doughnuts, pies, cookies, and pastries    · Dairy:      ¨ Whole milk, 2% milk, and yogurt and ice cream made with whole milk    ¨ Half and half creamer, heavy cream, and whipping cream    ¨ Cheese, cream cheese, and sour cream    · Meats and proteins:      ¨ High-fat cuts of meat (T-bone steak, regular hamburger, and ribs)    ¨ Fried meat, poultry (turkey and chicken), and fish    ¨ Poultry (chicken and turkey) with skin    ¨ Cold cuts (salami or bologna), hot dogs, hahn, and sausage    ¨ Whole eggs and egg yolks    · Vegetables and fruits with added fat:      ¨ Fried vegetables or vegetables in butter or high-fat sauces, such as cream or cheese sauces    ¨ Fried fruit or fruit served with butter or cream    · Fats:      ¨ Butter, stick margarine, and shortening    ¨ Coconut, palm oil, and palm kernel oil  Foods to include:   · Grains:      ¨ Whole-grain breads, cereals, pasta, and brown rice    ¨ Low-fat crackers and pretzels    · Vegetables and fruits:      ¨ Fresh, frozen, or canned vegetables (no salt or low-sodium)    ¨ Fresh, frozen, dried, or canned fruit (canned in light syrup or fruit juice)    ¨ Avocado    · Low-fat dairy products:      ¨ Nonfat (skim) or 1% milk    ¨ Nonfat or low-fat cheese, yogurt, and cottage cheese    · Meats and proteins:      ¨ Chicken or turkey with no skin    ¨ Baked or broiled fish    ¨ Lean beef and pork (loin, round, extra lean hamburger)    ¨ Beans and peas, unsalted nuts, soy products    ¨ Egg whites and substitutes    ¨ Seeds and nuts    · Fats:      ¨ Unsaturated oil, such as canola, olive, peanut, soybean, or sunflower oil    ¨ Soft or liquid margarine and vegetable oil spread    ¨ Low-fat salad dressing  Other ways to decrease fat:   · Read food labels before you buy foods  Choose foods that have less than 30% of calories from fat  Choose low-fat or fat-free dairy products  Remember that fat free does not mean calorie free  These foods still contain calories, and too many calories can lead to weight gain  · Trim fat from meat and avoid fried food  Trim all visible fat from meat before you cook it  Remove the skin from poultry  Do not mclean meat, fish, or poultry  Bake, roast, boil, or broil these foods instead  Avoid fried foods  Eat a baked potato instead of [place] fries  Steam vegetables instead of sautéing them in butter  · Add less fat to foods  Use imitation hahn bits on salads and baked potatoes instead of regular hahn bits  Use fat-free or low-fat salad dressings instead of regular dressings  Use low-fat or nonfat butter-flavored topping instead of regular butter or margarine on popcorn and other foods  Ways to decrease fat in recipes:  Replace high-fat ingredients with low-fat or nonfat ones  This may cause baked goods to be drier than usual  You may need to use nonfat cooking spray on pans to prevent food from sticking  You also may need to change the amount of other ingredients, such as water, in the recipe  Try the following:  · Use low-fat or light margarine instead of regular margarine or shortening  · Use lean ground turkey breast or chicken, or lean ground beef (less than 5% fat) instead of hamburger  · Add 1 teaspoon of canola oil to 8 ounces of skim milk instead of using cream or half and half  · Use grated zucchini, carrots, or apples in breads instead of coconut  · Use blenderized, low-fat cottage cheese, plain tofu, or low-fat ricotta cheese instead of cream cheese       · Use 1 egg white and 1 teaspoon of canola oil, or use ¼ cup (2 ounces) of fat-free egg substitute instead of a whole egg  · Replace half of the oil that is called for in a recipe with applesauce when you bake  Use 3 tablespoons of cocoa powder and 1 tablespoon of canola oil instead of a square of baking chocolate  How to increase fiber:  Eat enough high-fiber foods to get 20 to 30 grams of fiber every day  Slowly increase your fiber intake to avoid stomach cramps, gas, and other problems  · Eat 3 ounces of whole-grain foods each day  An ounce is about 1 slice of bread  Eat whole-grain breads, such as whole-wheat bread  Whole wheat, whole-wheat flour, or other whole grains should be listed as the first ingredient on the food label  Replace white flour with whole-grain flour or use half of each in recipes  Whole-grain flour is heavier than white flour, so you may have to add more yeast or baking powder  · Eat a high-fiber cereal for breakfast   Oatmeal is a good source of soluble fiber  Look for cereals that have bran or fiber in the name  Choose whole-grain products, such as brown rice, barley, and whole-wheat pasta  · Eat more beans, peas, and lentils  For example, add beans to soups or salads  Eat at least 5 cups of fruits and vegetables each day  Eat fruits and vegetables with the peel because the peel is high in fiber  © 2017 [employer] Information is for End User's use only and may not be sold, redistributed or otherwise used for commercial purposes  All illustrations and images included in CareNotes® are the copyrighted property of [employer] , [employer]  or Florentin White  The above information is an  only  It is not intended as medical advice for individual conditions or treatments  Talk to your doctor, nurse or pharmacist before following any medical regimen to see if it is safe and effective for you

## 2020-08-06 ENCOUNTER — TELEPHONE (OUTPATIENT)
Dept: FAMILY MEDICINE CLINIC | Facility: CLINIC | Age: 25
End: 2020-08-06

## 2020-08-06 NOTE — TELEPHONE ENCOUNTER
----- Message from Isabel Perkins, [address]  sent at 8/6/2020  4:45 PM EDT -----  Please inform patient that her Tsh for her thyroid are elevated at 7 1  Is she taking her medication every day? I will need to increase her medication  She also has elevated TG   She needs to exercise daily, even if it is just taking two walks a day

## 2020-08-08 DIAGNOSIS — E06.3 HYPOTHYROIDISM DUE TO HASHIMOTO'S THYROIDITIS: Primary | ICD-10-CM

## 2020-08-08 DIAGNOSIS — E03.8 HYPOTHYROIDISM DUE TO HASHIMOTO'S THYROIDITIS: Primary | ICD-10-CM

## 2020-08-08 LAB
ALBUMIN SERPL-MCNC: 4.1 G/DL (ref 3.6–5.1)
ALBUMIN/GLOB SERPL: 1.3 (CALC) (ref 1–2.5)
ALP SERPL-CCNC: 60 U/L (ref 31–125)
ALT SERPL-CCNC: 18 U/L (ref 6–29)
AST SERPL-CCNC: 15 U/L (ref 10–30)
BASOPHILS # BLD AUTO: 58 CELLS/UL (ref 0–200)
BASOPHILS NFR BLD AUTO: 0.6 %
BILIRUB SERPL-MCNC: 0.6 MG/DL (ref 0.2–1.2)
BUN SERPL-MCNC: 15 MG/DL (ref 7–25)
BUN/CREAT SERPL: NORMAL (CALC) (ref 6–22)
CALCIUM SERPL-MCNC: 9.3 MG/DL (ref 8.6–10.2)
CHLORIDE SERPL-SCNC: 103 MMOL/L (ref 98–110)
CHOLEST SERPL-MCNC: 159 MG/DL
CHOLEST/HDLC SERPL: 3.4 (CALC)
CO2 SERPL-SCNC: 26 MMOL/L (ref 20–32)
CREAT SERPL-MCNC: 1 MG/DL (ref 0.5–1.1)
EOSINOPHIL # BLD AUTO: 221 CELLS/UL (ref 15–500)
EOSINOPHIL NFR BLD AUTO: 2.3 %
ERYTHROCYTE [DISTWIDTH] IN BLOOD BY AUTOMATED COUNT: 13 % (ref 11–15)
ESTROGEN SERPL-MCNC: 319.2 PG/ML
FERRITIN SERPL-MCNC: 35 NG/ML (ref 16–154)
GLOBULIN SER CALC-MCNC: 3.2 G/DL (CALC) (ref 1.9–3.7)
GLUCOSE SERPL-MCNC: 96 MG/DL (ref 65–99)
HCT VFR BLD AUTO: 40.5 % (ref 35–45)
HDLC SERPL-MCNC: 47 MG/DL
HGB BLD-MCNC: 13.3 G/DL (ref 11.7–15.5)
IRON SATN MFR SERPL: 16 % (CALC) (ref 16–45)
IRON SERPL-MCNC: 61 MCG/DL (ref 40–190)
LDLC SERPL CALC-MCNC: 84 MG/DL (CALC)
LYMPHOCYTES # BLD AUTO: 3139 CELLS/UL (ref 850–3900)
LYMPHOCYTES NFR BLD AUTO: 32.7 %
MCH RBC QN AUTO: 28.4 PG (ref 27–33)
MCHC RBC AUTO-ENTMCNC: 32.8 G/DL (ref 32–36)
MCV RBC AUTO: 86.4 FL (ref 80–100)
MONOCYTES # BLD AUTO: 509 CELLS/UL (ref 200–950)
MONOCYTES NFR BLD AUTO: 5.3 %
NEUTROPHILS # BLD AUTO: 5674 CELLS/UL (ref 1500–7800)
NEUTROPHILS NFR BLD AUTO: 59.1 %
NONHDLC SERPL-MCNC: 112 MG/DL (CALC)
PLATELET # BLD AUTO: 298 THOUSAND/UL (ref 140–400)
PMV BLD REES-ECKER: 10.9 FL (ref 7.5–12.5)
POTASSIUM SERPL-SCNC: 4.2 MMOL/L (ref 3.5–5.3)
PROGEST SERPL-MCNC: 0.5 NG/ML
PROLACTIN SERPL-MCNC: 11.3 NG/ML
PROT SERPL-MCNC: 7.3 G/DL (ref 6.1–8.1)
RBC # BLD AUTO: 4.69 MILLION/UL (ref 3.8–5.1)
SL AMB EGFR AFRICAN AMERICAN: 91 ML/MIN/1.73M2
SL AMB EGFR NON AFRICAN AMERICAN: 78 ML/MIN/1.73M2
SODIUM SERPL-SCNC: 139 MMOL/L (ref 135–146)
T3FREE SERPL-MCNC: 2.9 PG/ML (ref 2.3–4.2)
THYROPEROXIDASE AB SERPL-ACNC: 163 IU/ML
TIBC SERPL-MCNC: 375 MCG/DL (CALC) (ref 250–450)
TRIGL SERPL-MCNC: 181 MG/DL
TSH SERPL-ACNC: 7.5 MIU/L
WBC # BLD AUTO: 9.6 THOUSAND/UL (ref 3.8–10.8)

## 2020-08-08 RX ORDER — LEVOTHYROXINE SODIUM 0.15 MG/1
150 TABLET ORAL
Qty: 30 TABLET | Refills: 5 | Status: SHIPPED | OUTPATIENT
Start: 2020-08-08 | End: 2021-04-15 | Stop reason: SDUPTHER

## 2020-08-12 ENCOUNTER — TELEPHONE (OUTPATIENT)
Dept: FAMILY MEDICINE CLINIC | Facility: CLINIC | Age: 25
End: 2020-08-12

## 2020-08-12 DIAGNOSIS — R21 RASH: Primary | ICD-10-CM

## 2020-08-12 RX ORDER — NYSTATIN 100000 U/G
OINTMENT TOPICAL 2 TIMES DAILY
Qty: 30 G | Refills: 3 | Status: SHIPPED | OUTPATIENT
Start: 2020-08-12 | End: 2021-01-15

## 2020-08-12 NOTE — TELEPHONE ENCOUNTER
Patient was in and spoke to you about getting yeast infections in the folds of the skin  You stated you would call in a cream when it happened again   She is in need of the cream  Can you please send to [facility], [facility]

## 2020-09-02 ENCOUNTER — TELEPHONE (OUTPATIENT)
Dept: BARIATRICS | Facility: CLINIC | Age: 25
End: 2020-09-02

## 2020-09-02 NOTE — TELEPHONE ENCOUNTER
COVID Pre-Visit Screening     1  Is this a family member screening? no  2  Have you traveled outside of your state in the past 2 weeks?no  3  Do you presently have a fever or flu-like symptoms? no  4  Do you have symptoms of an upper respiratory infection like runny nose, sore throat, or cough? no  5  Are you suffering from new headache that you have not had in the past?  no  6  Do you have/have you experienced any new shortness of breath recently? no  7  Do you have any new diarrhea, nausea or vomiting? no  8  Have you been in contact with anyone who has been sick or diagnosed with COVID-19? no  9  Do you have any new loss of taste or smell?no  10  Are you able to wear a mask without a valve for the entire visit?  yes

## 2020-09-03 ENCOUNTER — CONSULT (OUTPATIENT)
Dept: BARIATRICS | Facility: CLINIC | Age: 25
End: 2020-09-03
Payer: COMMERCIAL

## 2020-09-03 VITALS
TEMPERATURE: 97.5 F | HEART RATE: 93 BPM | SYSTOLIC BLOOD PRESSURE: 138 MMHG | WEIGHT: 293 LBS | BODY MASS INDEX: 45.99 KG/M2 | HEIGHT: 67 IN | DIASTOLIC BLOOD PRESSURE: 90 MMHG

## 2020-09-03 DIAGNOSIS — E28.2 PCOS (POLYCYSTIC OVARIAN SYNDROME): ICD-10-CM

## 2020-09-03 DIAGNOSIS — E06.3 HYPOTHYROIDISM DUE TO HASHIMOTO'S THYROIDITIS: ICD-10-CM

## 2020-09-03 DIAGNOSIS — E66.01 MORBID OBESITY WITH BMI OF 50.0-59.9, ADULT (HCC): Primary | ICD-10-CM

## 2020-09-03 DIAGNOSIS — E03.8 HYPOTHYROIDISM DUE TO HASHIMOTO'S THYROIDITIS: ICD-10-CM

## 2020-09-03 PROCEDURE — 99244 OFF/OP CNSLTJ NEW/EST MOD 40: CPT | Performed by: PHYSICIAN ASSISTANT

## 2020-09-03 NOTE — PROGRESS NOTES
Assessment/Plan: Morbid obesity with BMI of 50 0-59 9, adult ([facility] )  -Discussed options of Healthy[facility]-Intensive Lifestyle Intervention Program, Very Low Calorie Diet-VLCD, Conservative Program, Ruddy-En-Y Gastric Bypass and Vertical Sleeve Gastrectomy and the role of weight loss medications   -Initial weight loss goal of 5-10% weight loss for improved health  -Screening labs  Recommend checking lab coverage before having labs drawn   -lipids, tsh, cmp reviewed from 8/5/20 all within acceptable limits except for tsh and elevated triglycerides  Needs a1c and fasting insulin   - STOP BANG-3/8  -Patient is interested in pursuing surgery   -not a sympathomimetic candidate due to palpitations       Hypothyroidism due to Hashimoto's thyroiditis  Taking levothyroxine  -continue management with prescribing provider      PCOS (polycystic ovarian syndrome)  Not using ocp's or metformin --have previously taken and it made her nauseous all the time       Diagnoses and all orders for this visit:    Morbid obesity with BMI of 50 0-59 9, adult ([place] [id number] )  -     Hemoglobin A1C; Future  -     Insulin, fasting; Future  -     Ambulatory referral to Weight Management    Hypothyroidism due to Hashimoto's thyroiditis  -     Hemoglobin A1C; Future  -     Insulin, fasting; Future    PCOS (polycystic ovarian syndrome)  -     Hemoglobin A1C; Future  -     Insulin, fasting; Future          Subjective:   Chief Complaint   Patient presents with    Consult     Patient is here for initial MWM consult with PA  BMI 55 8  Negative stop bang (3 of 8)  Patient ID: Heather Goldberg  is a 22 y o  female with excess weight/obesity here to pursue weight management      Past Medical History:   Diagnosis Date    Anxiety     Disease of thyroid gland     hypothyroid    Morbid obesity with BMI of 50 0-59 9, adult ([facility])        HPI:  Obesity/Excess Weight:  Severity: Very Severe  Onset: since age 9     Modifiers: Diet and Exercise  Contributing factors: snacking   Associated symptoms: fatigue     Notes she is vegan-protein shakes, fruits, veggies, tofu, veggie straws     Goals: feel better   Hydration:  oz of water, 1 cup of coffee with almond milk and creamer   Alcohol: rare     Colonoscopy-Not applicable    The following portions of the patient's history were reviewed and updated as appropriate: allergies, current medications, past family history, past medical history, past social history, past surgical history and problem list     Review of Systems   HENT: Negative for sore throat  Respiratory: Negative for cough and shortness of breath  Cardiovascular: Positive for palpitations (pcp aware)  Negative for chest pain  Gastrointestinal: Negative for abdominal pain, constipation, diarrhea, nausea and vomiting  Denies GERD   Endocrine: Positive for cold intolerance  Negative for heat intolerance  Genitourinary: Negative for dysuria  Musculoskeletal: Negative for arthralgias and back pain  Skin: Positive for rash (eczema )  Neurological: Negative for headaches  Psychiatric/Behavioral: Negative for suicidal ideas (denies HI)  + Depression and anxiety-somewhat controlled  Feels anxiety could be better controlled  Objective:    /90 (BP Location: Right arm, Patient Position: Sitting, Cuff Size: Large)   Pulse 93   Temp 97 5 °F (36 4 °C) (Tympanic)   Ht 5' 6 5" (1 689 m)   Wt (!) 159 kg (351 lb 3 2 oz)   BMI 55 84 kg/m²     Physical Exam  Vitals signs and nursing note reviewed  Constitutional   General appearance: Abnormal   well developed and morbidly obese  Eyes No conjunctival pallor  Ears, Nose, Mouth, and Throat bilateral external ears-no discharge, edema,erythema   Pulmonary   Respiratory effort: No increased work of breathing or signs of respiratory distress  Auscultation of lungs: Clear to auscultation, equal breath sounds bilaterally, no wheezes, no rales, no rhonci      Cardiovascular   Auscultation of heart: Normal rate and rhythm, normal S1 and S2, without murmurs  Examination of extremities for edema and/or varicosities: Normal   no edema  Abdomen   Abdomen: Abnormal   The abdomen was obese  Bowel sounds were normal  The abdomen was soft and nontender     Musculoskeletal   Gait and station: Normal     Psychiatric   Orientation to person, place and time: Normal     Affect: appropriate

## 2020-09-03 NOTE — ASSESSMENT & PLAN NOTE
-Discussed options of HealthyCORE-Intensive Lifestyle Intervention Program, Very Low Calorie Diet-VLCD, Conservative Program, Ruddy-En-Y Gastric Bypass and Vertical Sleeve Gastrectomy and the role of weight loss medications   -Initial weight loss goal of 5-10% weight loss for improved health  -Screening labs  Recommend checking lab coverage before having labs drawn   -lipids, tsh, cmp reviewed from 8/5/20 all within acceptable limits except for tsh and elevated triglycerides   Needs a1c and fasting insulin   - STOP BANG-3/8  -Patient is interested in pursuing surgery   -not a sympathomimetic candidate due to palpitations

## 2020-09-16 ENCOUNTER — TELEPHONE (OUTPATIENT)
Dept: BARIATRICS | Facility: CLINIC | Age: 25
End: 2020-09-16

## 2020-09-17 ENCOUNTER — CONSULT (OUTPATIENT)
Dept: BARIATRICS | Facility: CLINIC | Age: 25
End: 2020-09-17
Payer: COMMERCIAL

## 2020-09-17 VITALS
WEIGHT: 293 LBS | HEART RATE: 92 BPM | SYSTOLIC BLOOD PRESSURE: 130 MMHG | HEIGHT: 67 IN | TEMPERATURE: 97.5 F | DIASTOLIC BLOOD PRESSURE: 90 MMHG | BODY MASS INDEX: 45.99 KG/M2

## 2020-09-17 DIAGNOSIS — E06.3 HYPOTHYROIDISM DUE TO HASHIMOTO'S THYROIDITIS: ICD-10-CM

## 2020-09-17 DIAGNOSIS — E66.01 CLASS 3 SEVERE OBESITY DUE TO EXCESS CALORIES WITH BODY MASS INDEX (BMI) OF 50.0 TO 59.9 IN ADULT, UNSPECIFIED WHETHER SERIOUS COMORBIDITY PRESENT (HCC): ICD-10-CM

## 2020-09-17 DIAGNOSIS — E66.01 MORBID (SEVERE) OBESITY DUE TO EXCESS CALORIES (HCC): ICD-10-CM

## 2020-09-17 DIAGNOSIS — E28.2 PCOS (POLYCYSTIC OVARIAN SYNDROME): ICD-10-CM

## 2020-09-17 DIAGNOSIS — Z01.818 ENCOUNTER FOR OTHER PREPROCEDURAL EXAMINATION: Primary | ICD-10-CM

## 2020-09-17 DIAGNOSIS — E03.8 HYPOTHYROIDISM DUE TO HASHIMOTO'S THYROIDITIS: ICD-10-CM

## 2020-09-17 PROCEDURE — 1036F TOBACCO NON-USER: CPT | Performed by: SURGERY

## 2020-09-17 PROCEDURE — 99204 OFFICE O/P NEW MOD 45 MIN: CPT | Performed by: SURGERY

## 2020-09-17 NOTE — LETTER
September 17, 2020     Rick Sanchez, [address]  [address] [facility]  [address]    Patient: Daiana Shipley   YOB: 1995   Date of Visit: 9/17/2020       Dear Rick Sanchez:    Thank you for referring Maribel Claire to me for evaluation for metabolic and bariatric  Below are my notes for this consultation  If you have questions, please do not hesitate to call me  I look forward to following your patient along with you  Sincerely,        Franci Joseph MD        CC: No Recipients  Franci Joseph MD  9/17/2020  2:25 PM  Sign when Signing Visit      [address] [facility] 22 y o  female MRN: [id number]  Unit/Bed#:  Encounter: [id number]      HPI:  Daiana Shipley is a 22 y o  female who presents with a longstanding history of morbid obesity and inability to sustain a meaningful weight loss  She is currently unemployed  She desires to pursue metabolic and bariatric surgery to improve her health and gain energy  Seldom NSAIDs  Infrequent marijuana (no card)  No DVT/PE  Here today to discuss bariatric options  Visit type: initial visit    Symptoms: inability to loss weight    Associated Symptoms: none    Associated Conditions: none  Disease Complications: PCOS  Weight Loss Interest: high    Exercise Frequency:daily  Types of Exercise: walking      Review of Systems   Constitutional: Positive for fatigue  Neurological: Positive for headaches  All other systems reviewed and are negative        Historical Information   Past Medical History:   Diagnosis Date    Anxiety     Disease of thyroid gland     hypothyroid    Hashimoto's disease     Morbid obesity with BMI of 50 0-59 9, adult (HCC)     PCOS (polycystic ovarian syndrome)      Past Surgical History:   Procedure Laterality Date    PILONIDAL CYST EXCISION       Social History   Social History     Substance and Sexual Activity   Alcohol Use Yes    Frequency: Monthly or less Comment: rarely     Social History     Substance and Sexual Activity   Drug Use Yes    Types: Marijuana     Social History     Tobacco Use   Smoking Status Never Smoker   Smokeless Tobacco Never Used     Family History: morbid obesity    Meds/Allergies   all medications and allergies reviewed  Allergies   Allergen Reactions    Metformin And Related GI Intolerance       Objective       Current Vitals:   /90 (BP Location: Right arm, Patient Position: Sitting, Cuff Size: Large)   Pulse 92   Temp 97 5 °F (36 4 °C) (Tympanic)   Ht 5' 6 5" (1 689 m)   Wt (!) 161 kg (354 lb 12 8 oz)   BMI 56 41 kg/m²       Invasive Devices     None                 Physical Exam  Vitals signs reviewed  Constitutional:       Appearance: Normal appearance  She is obese  HENT:      Head: Atraumatic  Nose: No rhinorrhea  Eyes:      Extraocular Movements: Extraocular movements intact  Neck:      Musculoskeletal: Normal range of motion  Cardiovascular:      Rate and Rhythm: Normal rate  Pulses: Normal pulses  Pulmonary:      Effort: Pulmonary effort is normal  No respiratory distress  Abdominal:      General: Abdomen is flat  There is no distension  Palpations: Abdomen is soft  Tenderness: There is no abdominal tenderness  Musculoskeletal: Normal range of motion  Skin:     General: Skin is warm and dry  Neurological:      General: No focal deficit present  Mental Status: She is alert and oriented to person, place, and time  Psychiatric:         Mood and Affect: Mood normal          Behavior: Behavior normal          Lab Results: I have personally reviewed pertinent lab results  Imaging: I have personally reviewed pertinent reports  EKG, Pathology, and Other Studies: I have personally reviewed pertinent reports          Assessment/PLAN:    22 y o  yo female with a long standing h/o of obesity and inability to sustain any meaningful weight loss on her own despite several attempts  She is interested in the Laparoscopic sleeve gastrectomy and rocael-en-y gastric bypass  Patient has been counseled about the risk of developing gastroesophageal reflux disease (GERD), worsening of current GERD and/or silent reflux  Patient has also been counseled on the risk of developing Rojas's esophagus (18%)  As a result the patient may require treatment with medications, further interventions and possibly additional surgery  Patient will require routine endoscopic surveillance to monitor for these possible complications  As a part of her pre op evaluation, she will be referred to a cardiologist and for a sleep evaluation and consult after successful evaluation by our registered dietician, licensed clinical  and pre-certification/     She needs an EGD to evaluate the anatomy of her GI tract prior to the operation  I have spent over 45 minutes with her face to face in the office today discussing her options and details of the surgery  We have seen an animation of the surgery on the computer that illustrates how the operation is done and how the anatomy will be altered with the procedure  Over 50% of this was coordinating care  She was given the opportunity to ask questions and I have answered all of them  I have discussed and educated the patient with regards to the components of our multidisciplinary program and the importance of compliance and follow up in the post operative period  The patient was also instructed with regards to the importance of behavior modification, nutritional counseling, support meeting attendance and lifestyle changes that are important to ensure success  Although there is a great statistical chance of improvement or even resolution of most of her associated comorbidities, the results vary from patient to patient and they largely depend on her commitment and compliance       She needs to lose 35 lbs (319) prior to the operation      Evelyn Basurto MD  9/17/2020  2:21 PM

## 2020-09-17 NOTE — PROGRESS NOTES
BARIATRIC INITIAL CONSULT - [id number] [facility] 25 y o  female MRN: [id number]  Unit/Bed#:  Encounter: [id number]      HPI:  Anderson Corbett is a 22 y o  female who presents with a longstanding history of morbid obesity and inability to sustain a meaningful weight loss  She is currently unemployed  She desires to pursue metabolic and bariatric surgery to improve her health and gain energy  Seldom NSAIDs  Infrequent marijuana (no card)  No DVT/PE  Here today to discuss bariatric options  Visit type: initial visit    Symptoms: inability to loss weight    Associated Symptoms: none    Associated Conditions: none  Disease Complications: PCOS  Weight Loss Interest: high    Exercise Frequency:daily  Types of Exercise: walking      Review of Systems   Constitutional: Positive for fatigue  Neurological: Positive for headaches  All other systems reviewed and are negative        Historical Information   Past Medical History:   Diagnosis Date    Anxiety     Disease of thyroid gland     hypothyroid    Hashimoto's disease     Morbid obesity with BMI of 50 0-59 9, adult (HCC)     PCOS (polycystic ovarian syndrome)      Past Surgical History:   Procedure Laterality Date    PILONIDAL CYST EXCISION       Social History   Social History     Substance and Sexual Activity   Alcohol Use Yes    Frequency: Monthly or less    Comment: rarely     Social History     Substance and Sexual Activity   Drug Use Yes    Types: Marijuana     Social History     Tobacco Use   Smoking Status Never Smoker   Smokeless Tobacco Never Used     Family History: morbid obesity    Meds/Allergies   all medications and allergies reviewed  Allergies   Allergen Reactions    Metformin And Related GI Intolerance       Objective       Current Vitals:   /90 (BP Location: Right arm, Patient Position: Sitting, Cuff Size: Large)   Pulse 92   Temp 97 5 °F (36 4 °C) (Tympanic)   Ht 5' 6 5" (1 689 m)   Wt (!) 161 kg (354 lb 12 8 oz)   BMI 56 41 kg/m²       Invasive Devices     None                 Physical Exam  Vitals signs reviewed  Constitutional:       Appearance: Normal appearance  She is obese  HENT:      Head: Atraumatic  Nose: No rhinorrhea  Eyes:      Extraocular Movements: Extraocular movements intact  Neck:      Musculoskeletal: Normal range of motion  Cardiovascular:      Rate and Rhythm: Normal rate  Pulses: Normal pulses  Pulmonary:      Effort: Pulmonary effort is normal  No respiratory distress  Abdominal:      General: Abdomen is flat  There is no distension  Palpations: Abdomen is soft  Tenderness: There is no abdominal tenderness  Musculoskeletal: Normal range of motion  Skin:     General: Skin is warm and dry  Neurological:      General: No focal deficit present  Mental Status: She is alert and oriented to person, place, and time  Psychiatric:         Mood and Affect: Mood normal          Behavior: Behavior normal          Lab Results: I have personally reviewed pertinent lab results  Imaging: I have personally reviewed pertinent reports  EKG, Pathology, and Other Studies: I have personally reviewed pertinent reports  Assessment/PLAN:    22 y o  yo female with a long standing h/o of obesity and inability to sustain any meaningful weight loss on her own despite several attempts  She is interested in the Laparoscopic sleeve gastrectomy and rocael-en-y gastric bypass  Patient has been counseled about the risk of developing gastroesophageal reflux disease (GERD), worsening of current GERD and/or silent reflux  Patient has also been counseled on the risk of developing Rojas's esophagus (18%)  As a result the patient may require treatment with medications, further interventions and possibly additional surgery  Patient will require routine endoscopic surveillance to monitor for these possible complications       As a part of her pre op evaluation, she will be referred to a cardiologist and for a sleep evaluation and consult after successful evaluation by our registered dietician, licensed clinical  and pre-certification/     She needs an EGD to evaluate the anatomy of her GI tract prior to the operation  I have spent over 45 minutes with her face to face in the office today discussing her options and details of the surgery  We have seen an animation of the surgery on the computer that illustrates how the operation is done and how the anatomy will be altered with the procedure  Over 50% of this was coordinating care  She was given the opportunity to ask questions and I have answered all of them  I have discussed and educated the patient with regards to the components of our multidisciplinary program and the importance of compliance and follow up in the post operative period  The patient was also instructed with regards to the importance of behavior modification, nutritional counseling, support meeting attendance and lifestyle changes that are important to ensure success  Although there is a great statistical chance of improvement or even resolution of most of her associated comorbidities, the results vary from patient to patient and they largely depend on her commitment and compliance  She needs to lose 35 lbs (319) prior to the operation      Good Vázquez MD  9/17/2020  2:21 PM

## 2020-09-29 ENCOUNTER — TELEPHONE (OUTPATIENT)
Dept: FAMILY MEDICINE CLINIC | Facility: CLINIC | Age: 25
End: 2020-09-29

## 2020-09-29 NOTE — TELEPHONE ENCOUNTER
Mom calls stating her and her son are both bery sick  Went for covid test today  Bindu Estrada lives in the house with them - she doesn't have any symptoms yet but should she also get tested yet?    Call mom

## 2020-10-01 DIAGNOSIS — Z20.828 EXPOSURE TO SARS VIRUS: Primary | ICD-10-CM

## 2020-10-02 DIAGNOSIS — Z20.828 EXPOSURE TO SARS VIRUS: ICD-10-CM

## 2020-10-02 PROCEDURE — U0003 INFECTIOUS AGENT DETECTION BY NUCLEIC ACID (DNA OR RNA); SEVERE ACUTE RESPIRATORY SYNDROME CORONAVIRUS 2 (SARS-COV-2) (CORONAVIRUS DISEASE [COVID-19]), AMPLIFIED PROBE TECHNIQUE, MAKING USE OF HIGH THROUGHPUT TECHNOLOGIES AS DESCRIBED BY CMS-2020-01-R: HCPCS | Performed by: FAMILY MEDICINE

## 2020-10-03 LAB — SARS-COV-2 RNA SPEC QL NAA+PROBE: NOT DETECTED

## 2020-10-28 ENCOUNTER — TELEPHONE (OUTPATIENT)
Dept: OTHER | Facility: OTHER | Age: 25
End: 2020-10-28

## 2020-11-05 ENCOUNTER — TELEMEDICINE (OUTPATIENT)
Dept: FAMILY MEDICINE CLINIC | Facility: CLINIC | Age: 25
End: 2020-11-05
Payer: COMMERCIAL

## 2020-11-05 DIAGNOSIS — D50.8 IRON DEFICIENCY ANEMIA SECONDARY TO INADEQUATE DIETARY IRON INTAKE: ICD-10-CM

## 2020-11-05 DIAGNOSIS — E06.3 HYPOTHYROIDISM DUE TO HASHIMOTO'S THYROIDITIS: Primary | ICD-10-CM

## 2020-11-05 DIAGNOSIS — E03.8 HYPOTHYROIDISM DUE TO HASHIMOTO'S THYROIDITIS: Primary | ICD-10-CM

## 2020-11-05 DIAGNOSIS — E66.01 MORBID OBESITY WITH BMI OF 50.0-59.9, ADULT (HCC): ICD-10-CM

## 2020-11-05 PROCEDURE — 1036F TOBACCO NON-USER: CPT | Performed by: NURSE PRACTITIONER

## 2020-11-05 PROCEDURE — 99214 OFFICE O/P EST MOD 30 MIN: CPT | Performed by: NURSE PRACTITIONER

## 2020-11-05 RX ORDER — PHENTERMINE HYDROCHLORIDE 37.5 MG/1
37.5 TABLET ORAL DAILY
Qty: 30 TABLET | Refills: 2 | Status: SHIPPED | OUTPATIENT
Start: 2020-11-05 | End: 2020-11-05 | Stop reason: SDUPTHER

## 2020-11-05 RX ORDER — PHENTERMINE HYDROCHLORIDE 37.5 MG/1
37.5 TABLET ORAL DAILY
Qty: 30 TABLET | Refills: 2 | Status: SHIPPED | OUTPATIENT
Start: 2020-11-05 | End: 2021-01-15

## 2020-11-13 ENCOUNTER — TELEPHONE (OUTPATIENT)
Dept: OTHER | Facility: OTHER | Age: 25
End: 2020-11-13

## 2020-12-07 ENCOUNTER — CLINICAL SUPPORT (OUTPATIENT)
Dept: BARIATRICS | Facility: CLINIC | Age: 25
End: 2020-12-07

## 2020-12-07 VITALS
DIASTOLIC BLOOD PRESSURE: 84 MMHG | HEART RATE: 101 BPM | BODY MASS INDEX: 45.99 KG/M2 | SYSTOLIC BLOOD PRESSURE: 130 MMHG | WEIGHT: 293 LBS | HEIGHT: 67 IN | TEMPERATURE: 97.2 F

## 2020-12-07 DIAGNOSIS — E66.01 MORBID (SEVERE) OBESITY DUE TO EXCESS CALORIES (HCC): Primary | ICD-10-CM

## 2020-12-07 DIAGNOSIS — Z98.84 BARIATRIC SURGERY STATUS: Primary | ICD-10-CM

## 2020-12-07 DIAGNOSIS — E66.01 MORBID (SEVERE) OBESITY DUE TO EXCESS CALORIES (HCC): ICD-10-CM

## 2020-12-07 PROCEDURE — RECHECK

## 2020-12-07 PROCEDURE — 3008F BODY MASS INDEX DOCD: CPT | Performed by: NURSE PRACTITIONER

## 2020-12-08 DIAGNOSIS — Z98.84 BARIATRIC SURGERY STATUS: ICD-10-CM

## 2020-12-08 DIAGNOSIS — E66.01 MORBID (SEVERE) OBESITY DUE TO EXCESS CALORIES (HCC): Primary | ICD-10-CM

## 2020-12-17 ENCOUNTER — VBI (OUTPATIENT)
Dept: ADMINISTRATIVE | Facility: OTHER | Age: 25
End: 2020-12-17

## 2020-12-29 ENCOUNTER — TELEPHONE (OUTPATIENT)
Dept: ENDOCRINOLOGY | Facility: CLINIC | Age: 25
End: 2020-12-29

## 2021-01-06 ENCOUNTER — TELEPHONE (OUTPATIENT)
Dept: BARIATRICS | Facility: CLINIC | Age: 26
End: 2021-01-06

## 2021-01-06 ENCOUNTER — TELEPHONE (OUTPATIENT)
Dept: OTHER | Facility: OTHER | Age: 26
End: 2021-01-06

## 2021-01-06 NOTE — TELEPHONE ENCOUNTER
PT CALLED TO CANCEL APPT FOR TODAY 1/6/21 @ 1030 AND 1100, DUE TO NOT FEELING WELL  PT WILL CALL OFFICE TO RESCHEDULE

## 2021-01-14 ENCOUNTER — LAB (OUTPATIENT)
Dept: LAB | Facility: HOSPITAL | Age: 26
End: 2021-01-14
Payer: COMMERCIAL

## 2021-01-14 DIAGNOSIS — E66.01 MORBID OBESITY WITH BMI OF 50.0-59.9, ADULT (HCC): ICD-10-CM

## 2021-01-14 DIAGNOSIS — Z98.84 BARIATRIC SURGERY STATUS: ICD-10-CM

## 2021-01-14 DIAGNOSIS — E03.8 HYPOTHYROIDISM DUE TO HASHIMOTO'S THYROIDITIS: ICD-10-CM

## 2021-01-14 DIAGNOSIS — E66.01 MORBID (SEVERE) OBESITY DUE TO EXCESS CALORIES (HCC): ICD-10-CM

## 2021-01-14 DIAGNOSIS — D50.8 IRON DEFICIENCY ANEMIA SECONDARY TO INADEQUATE DIETARY IRON INTAKE: ICD-10-CM

## 2021-01-14 DIAGNOSIS — E06.3 HYPOTHYROIDISM DUE TO HASHIMOTO'S THYROIDITIS: ICD-10-CM

## 2021-01-14 LAB
ALBUMIN SERPL BCP-MCNC: 3.5 G/DL (ref 3.5–5)
ALP SERPL-CCNC: 69 U/L (ref 46–116)
ALT SERPL W P-5'-P-CCNC: 31 U/L (ref 12–78)
ANION GAP SERPL CALCULATED.3IONS-SCNC: 10 MMOL/L (ref 4–13)
AST SERPL W P-5'-P-CCNC: 22 U/L (ref 5–45)
BILIRUB SERPL-MCNC: 0.5 MG/DL (ref 0.2–1)
BUN SERPL-MCNC: 9 MG/DL (ref 5–25)
CALCIUM SERPL-MCNC: 8.7 MG/DL (ref 8.3–10.1)
CHLORIDE SERPL-SCNC: 104 MMOL/L (ref 100–108)
CHOLEST SERPL-MCNC: 170 MG/DL (ref 50–200)
CO2 SERPL-SCNC: 26 MMOL/L (ref 21–32)
CREAT SERPL-MCNC: 1.16 MG/DL (ref 0.6–1.3)
ERYTHROCYTE [DISTWIDTH] IN BLOOD BY AUTOMATED COUNT: 13.2 % (ref 11.6–15.1)
EST. AVERAGE GLUCOSE BLD GHB EST-MCNC: 103 MG/DL
FERRITIN SERPL-MCNC: 58 NG/ML (ref 8–388)
GFR SERPL CREATININE-BSD FRML MDRD: 66 ML/MIN/1.73SQ M
GLUCOSE P FAST SERPL-MCNC: 101 MG/DL (ref 65–99)
HBA1C MFR BLD: 5.2 %
HCT VFR BLD AUTO: 41.8 % (ref 34.8–46.1)
HDLC SERPL-MCNC: 44 MG/DL
HGB BLD-MCNC: 13.8 G/DL (ref 11.5–15.4)
IRON SATN MFR SERPL: 14 %
IRON SERPL-MCNC: 50 UG/DL (ref 50–170)
LDLC SERPL CALC-MCNC: 87 MG/DL (ref 0–100)
MCH RBC QN AUTO: 29.2 PG (ref 26.8–34.3)
MCHC RBC AUTO-ENTMCNC: 33 G/DL (ref 31.4–37.4)
MCV RBC AUTO: 88 FL (ref 82–98)
NONHDLC SERPL-MCNC: 126 MG/DL
PLATELET # BLD AUTO: 299 THOUSANDS/UL (ref 149–390)
PMV BLD AUTO: 11.1 FL (ref 8.9–12.7)
POTASSIUM SERPL-SCNC: 3.9 MMOL/L (ref 3.5–5.3)
PROT SERPL-MCNC: 7.8 G/DL (ref 6.4–8.2)
RBC # BLD AUTO: 4.73 MILLION/UL (ref 3.81–5.12)
SODIUM SERPL-SCNC: 140 MMOL/L (ref 136–145)
T3FREE SERPL-MCNC: 2.33 PG/ML (ref 2.3–4.2)
TIBC SERPL-MCNC: 363 UG/DL (ref 250–450)
TRIGL SERPL-MCNC: 194 MG/DL
TSH SERPL DL<=0.05 MIU/L-ACNC: 3.7 UIU/ML (ref 0.36–3.74)
WBC # BLD AUTO: 7.46 THOUSAND/UL (ref 4.31–10.16)

## 2021-01-14 PROCEDURE — 83036 HEMOGLOBIN GLYCOSYLATED A1C: CPT

## 2021-01-14 PROCEDURE — 80061 LIPID PANEL: CPT

## 2021-01-14 PROCEDURE — 36415 COLL VENOUS BLD VENIPUNCTURE: CPT

## 2021-01-14 PROCEDURE — 84481 FREE ASSAY (FT-3): CPT

## 2021-01-14 PROCEDURE — 82728 ASSAY OF FERRITIN: CPT

## 2021-01-14 PROCEDURE — 85027 COMPLETE CBC AUTOMATED: CPT

## 2021-01-14 PROCEDURE — 80053 COMPREHEN METABOLIC PANEL: CPT

## 2021-01-14 PROCEDURE — 83540 ASSAY OF IRON: CPT

## 2021-01-14 PROCEDURE — 83550 IRON BINDING TEST: CPT

## 2021-01-14 PROCEDURE — 84443 ASSAY THYROID STIM HORMONE: CPT

## 2021-01-15 ENCOUNTER — OFFICE VISIT (OUTPATIENT)
Dept: ENDOCRINOLOGY | Facility: CLINIC | Age: 26
End: 2021-01-15
Payer: COMMERCIAL

## 2021-01-15 VITALS
BODY MASS INDEX: 48.82 KG/M2 | HEIGHT: 65 IN | OXYGEN SATURATION: 97 % | WEIGHT: 293 LBS | DIASTOLIC BLOOD PRESSURE: 68 MMHG | HEART RATE: 65 BPM | SYSTOLIC BLOOD PRESSURE: 138 MMHG

## 2021-01-15 DIAGNOSIS — E06.3 HYPOTHYROIDISM DUE TO HASHIMOTO'S THYROIDITIS: ICD-10-CM

## 2021-01-15 DIAGNOSIS — Z86.19: ICD-10-CM

## 2021-01-15 DIAGNOSIS — R53.83 OTHER FATIGUE: ICD-10-CM

## 2021-01-15 DIAGNOSIS — E03.8 HYPOTHYROIDISM DUE TO HASHIMOTO'S THYROIDITIS: ICD-10-CM

## 2021-01-15 DIAGNOSIS — E66.01 MORBID OBESITY WITH BMI OF 50.0-59.9, ADULT (HCC): Primary | ICD-10-CM

## 2021-01-15 PROBLEM — N91.5 OLIGOMENORRHEA: Status: ACTIVE | Noted: 2017-05-22

## 2021-01-15 PROCEDURE — 1036F TOBACCO NON-USER: CPT | Performed by: NURSE PRACTITIONER

## 2021-01-15 PROCEDURE — 3008F BODY MASS INDEX DOCD: CPT | Performed by: NURSE PRACTITIONER

## 2021-01-15 PROCEDURE — 99203 OFFICE O/P NEW LOW 30 MIN: CPT | Performed by: NURSE PRACTITIONER

## 2021-01-15 NOTE — PROGRESS NOTES
New Patient Progress Note       Chief Complaint   Patient presents with    Obesity    Hypothyroidism        History of Present Illness:     Wade Astorga is a 22 y o  female with Hashimoto's thyroiditis, PCOS, and morbid obesity presents to the office today to discuss bariatric surgery  Patient has been meeting with weight management under recently initiated conversations about bariatric surgery as management for her obesity  Today, the patient would like to discuss hormonal involvement in her struggles with weight loss  Patient was diagnosed with PCOS in her early 25s  She was following with [facility] for this  For a time, she was taking metformin however, this gave her persistent abdominal pain and was therefore discontinued  Patient has never been on contraception or spironolactone  She denies excessive hirsutism  She does report some hair growth on her chin  She states that she has been overweight the majority of her adult life  She reports that she eats healthfully and infrequently  She follows a vegan diet on typically only eats 1-2 times daily  She had been taking phentermine for a few months but found that that reduced her appetite to the point where she did not want to eat at all in the course of a day  She is unsure of when she was diagnosed with Hashimoto's thyroiditis  She is currently taking 150 mcg of levothyroxine daily  She reports taking this correctly and consistently  Her last TSH from 01/14/2021 was within normal limits at 3 7 uIU/mL  She thinks she may have a family history of thyroid disorders  She denies any family history of diabetes  Her last hemoglobin A1c was normal at 5 2 %  Patient's PCP is done a thorough hormonal workup  The only abnormality was an elevated TSH on 08/05/2020  This has been since corrected  It is evident that the patient would like to avoid surgery at this time    She states that the age of 24, she was able to lose 80 lb by going to the gym 5 days a week for approximately 1 year  She has not made such an attempt since then  She reports that she sleeps poorly  She has never been told that she snores  She does report waking up with headaches and has experienced daytime somnolence  She has never had a sleep study done      Patient Active Problem List   Diagnosis    PCOS (polycystic ovarian syndrome)    Hypothyroidism due to Hashimoto's thyroiditis    Morbid obesity with BMI of 50 0-59 9, adult ([place] [address] )    Lipid screening    Iron deficiency anemia secondary to inadequate dietary iron intake    Heart palpitations    Other chest pain    Abnormal bleeding in menstrual cycle    H/O fungal skin infection    Pannus, abdominal    Morbid obesity ([place] [address] )    Oligomenorrhea    Tinea corporis    Vitamin D deficiency      Past Medical History:   Diagnosis Date    Anxiety     Disease of thyroid gland     hypothyroid    Hashimoto's disease     Morbid obesity with BMI of 50 0-59 9, adult ([place] [address] )     PCOS (polycystic ovarian syndrome)       Past Surgical History:   Procedure Laterality Date    PILONIDAL CYST EXCISION        Family History   Problem Relation Age of Onset    Hypertension Mother     Thyroid disease Father     Depression Father     Breast cancer Maternal Grandmother     Liver cancer Maternal Grandmother     Depression Brother     Diabetes Neg Hx     Heart disease Neg Hx     Stroke Neg Hx      Social History     Tobacco Use    Smoking status: Never Smoker    Smokeless tobacco: Never Used   Substance Use Topics    Alcohol use: Yes     Comment: rarely     Allergies   Allergen Reactions    Metformin And Related GI Intolerance       Current Outpatient Medications:     levothyroxine 150 mcg tablet, Take 1 tablet (150 mcg total) by mouth daily in the early morning, Disp: 30 tablet, Rfl: 5    Current Facility-Administered Medications:     nystatin (MYCOSTATIN) powder, , Topical, BID, Dorothye Bella, MD    Review of Systems   Constitutional: Positive for fatigue  Negative for activity change, appetite change and unexpected weight change  HENT: Negative for sore throat, trouble swallowing and voice change  Eyes: Negative for visual disturbance  Respiratory: Negative for cough, chest tightness and shortness of breath  Cardiovascular: Negative for chest pain, palpitations and leg swelling  Gastrointestinal: Negative for constipation, diarrhea, nausea and vomiting  Endocrine: Negative for cold intolerance, heat intolerance, polydipsia, polyphagia and polyuria  Genitourinary: Positive for menstrual problem (Cycles are of varying lengths; menstruation is light)  Negative for frequency  Musculoskeletal: Negative for arthralgias, back pain, gait problem, joint swelling and myalgias  Skin: Negative for rash and wound  Allergic/Immunologic: Positive for environmental allergies  Neurological: Negative for dizziness, weakness, light-headedness, numbness and headaches  Hematological: Does not bruise/bleed easily  Psychiatric/Behavioral: Positive for sleep disturbance  Negative for decreased concentration and dysphoric mood  The patient is not nervous/anxious  Physical Exam:  Body mass index is 57 31 kg/m²  /68 (BP Location: Left arm, Cuff Size: Adult)   Pulse 65   Ht 5' 5" (1 651 m)   Wt (!) 156 kg (344 lb 6 4 oz)   SpO2 97%   BMI 57 31 kg/m²    Wt Readings from Last 3 Encounters:   01/15/21 (!) 156 kg (344 lb 6 4 oz)   12/07/20 (!) 158 kg (347 lb 12 8 oz)   11/20/20 (!) 160 kg (352 lb 12 8 oz)       Physical Exam  Constitutional:       General: She is not in acute distress  Appearance: She is well-developed  She is obese  She is not ill-appearing  HENT:      Head: Normocephalic and atraumatic  Comments: Mask in place  Eyes:      Pupils: Pupils are equal, round, and reactive to light  Neck:      Musculoskeletal: Normal range of motion and neck supple        Thyroid: No thyromegaly  Cardiovascular:      Rate and Rhythm: Normal rate and regular rhythm  Pulmonary:      Effort: Pulmonary effort is normal       Breath sounds: Normal breath sounds  Abdominal:      General: Bowel sounds are normal       Palpations: Abdomen is soft  Musculoskeletal:      Right lower leg: No edema  Left lower leg: No edema  Lymphadenopathy:      Cervical: No cervical adenopathy  Skin:     General: Skin is warm and dry  Capillary Refill: Capillary refill takes less than 2 seconds  Neurological:      Mental Status: She is alert and oriented to person, place, and time  Gait: Gait normal    Psychiatric:         Mood and Affect: Mood normal          Behavior: Behavior normal          Thought Content: Thought content normal          Judgment: Judgment normal          Labs:       Lab Results   Component Value Date    CREATININE 1 16 01/14/2021    CREATININE 1 00 08/05/2020    CREATININE 0 84 10/05/2019    BUN 9 01/14/2021    K 3 9 01/14/2021     01/14/2021    CO2 26 01/14/2021     eGFR   Date Value Ref Range Status   01/14/2021 66 ml/min/1 73sq m Final       Lab Results   Component Value Date    HDL 44 01/14/2021    TRIG 194 (H) 01/14/2021       Lab Results   Component Value Date    ALT 31 01/14/2021    AST 22 01/14/2021    ALKPHOS 69 01/14/2021       No results found for: FREET4, TSI      Impression & Plan:    Problem List Items Addressed This Visit        Endocrine    Hypothyroidism due to Hashimoto's thyroiditis     Thyroid function is currently stable  Continue levothyroxine  Musculoskeletal and Integument    H/O fungal skin infection     Reviewed the importance of drying 1 self thoroughly after shower  Also recommended Gold Rocha antifungal powder  Other    Morbid obesity with BMI of 50 0-59 9, adult [facility]) - Primary     Patient is clearly not comfortable with pursuing bariatric surgery at this time    Had a lengthy discussion about the importance of making a food diary, calorie counting, and increasing physical activity  Recommended that she meet with weight management team in [place] to discuss more thoroughly  Counseled the patient on the negative sequela of untreated morbid obesity including diabetes, hypertension, hyperlipidemia, cardiovascular disease, and orthopedic complications  Referral for Sleep Medicine provided to rule out obstructive sleep apnea  Relevant Orders    Ambulatory referral to Sleep Medicine    Ambulatory referral to Weight Management      Other Visit Diagnoses     Other fatigue        Relevant Orders    Ambulatory referral to Sleep Medicine          Orders Placed This Encounter   Procedures    Ambulatory referral to Sleep Medicine     Standing Status:   Future     Standing Expiration Date:   1/15/2022     Referral Priority:   Routine     Referral Type:   Consult - AMB     Referral Reason:   Specialty Services Required     Requested Specialty:   Sleep Medicine     Number of Visits Requested:   1     Expiration Date:   1/15/2022    Ambulatory referral to Weight Management     Standing Status:   Future     Standing Expiration Date:   1/15/2022     Referral Priority:   Routine     Referral Type:   Consult - AMB     Referral Reason:   Specialty Services Required     Referred to Provider:   Nayan Roa PA-C     Requested Specialty:   Bariatrics     Number of Visits Requested:   1     Expiration Date:   1/15/2022       Discussed with the patient and all questioned fully answered  She will call me if any problems arise  Follow-up appointment in 6 months       Counseled patient on diagnostic results, prognosis, risk and benefit of treatment options, instruction for management, importance of treatment compliance, Risk  factor reduction and impressions      PASCALE Mujica

## 2021-01-15 NOTE — ASSESSMENT & PLAN NOTE
Reviewed the importance of drying 1 self thoroughly after shower  Also recommended Gold Rocha antifungal powder

## 2021-01-15 NOTE — PROGRESS NOTES
Went to weight loss management in [place] - talked about getting dong surgery and she was "iffy about that " She has Hashimotos and PCOS and thinks it may hormone related  Looking for a another opinion before she would choose to go through with the surgery

## 2021-01-15 NOTE — ASSESSMENT & PLAN NOTE
Patient is clearly not comfortable with pursuing bariatric surgery at this time  Had a lengthy discussion about the importance of making a food diary, calorie counting, and increasing physical activity  Recommended that she meet with weight management team in [place] to discuss more thoroughly  Counseled the patient on the negative sequela of untreated morbid obesity including diabetes, hypertension, hyperlipidemia, cardiovascular disease, and orthopedic complications  Referral for Sleep Medicine provided to rule out obstructive sleep apnea

## 2021-02-03 ENCOUNTER — TELEPHONE (OUTPATIENT)
Dept: BARIATRICS | Facility: CLINIC | Age: 26
End: 2021-02-03

## 2021-02-03 NOTE — TELEPHONE ENCOUNTER
Called patient to find out if she is still interested in surgery  Patient reports that she is not interested at this time  Messaged coordinator to halt patient   HC LCSW

## 2021-03-04 ENCOUNTER — TELEPHONE (OUTPATIENT)
Dept: PSYCHIATRY | Facility: CLINIC | Age: 26
End: 2021-03-04

## 2021-04-15 ENCOUNTER — TELEPHONE (OUTPATIENT)
Dept: FAMILY MEDICINE CLINIC | Facility: CLINIC | Age: 26
End: 2021-04-15

## 2021-04-15 DIAGNOSIS — E03.8 HYPOTHYROIDISM DUE TO HASHIMOTO'S THYROIDITIS: ICD-10-CM

## 2021-04-15 DIAGNOSIS — E06.3 HYPOTHYROIDISM DUE TO HASHIMOTO'S THYROIDITIS: ICD-10-CM

## 2021-04-15 RX ORDER — LEVOTHYROXINE SODIUM 0.15 MG/1
150 TABLET ORAL
Qty: 30 TABLET | Refills: 1 | Status: SHIPPED | OUTPATIENT
Start: 2021-04-15 | End: 2021-08-02

## 2021-04-21 ENCOUNTER — IMMUNIZATIONS (OUTPATIENT)
Dept: FAMILY MEDICINE CLINIC | Facility: HOSPITAL | Age: 26
End: 2021-04-21

## 2021-04-21 DIAGNOSIS — Z23 ENCOUNTER FOR IMMUNIZATION: Primary | ICD-10-CM

## 2021-04-21 PROCEDURE — 91300 SARS-COV-2 / COVID-19 MRNA VACCINE (PFIZER-BIONTECH) 30 MCG: CPT

## 2021-04-21 PROCEDURE — 0001A SARS-COV-2 / COVID-19 MRNA VACCINE (PFIZER-BIONTECH) 30 MCG: CPT

## 2021-05-12 ENCOUNTER — IMMUNIZATIONS (OUTPATIENT)
Dept: FAMILY MEDICINE CLINIC | Facility: HOSPITAL | Age: 26
End: 2021-05-12

## 2021-05-12 DIAGNOSIS — Z23 ENCOUNTER FOR IMMUNIZATION: Primary | ICD-10-CM

## 2021-05-12 PROCEDURE — 0002A SARS-COV-2 / COVID-19 MRNA VACCINE (PFIZER-BIONTECH) 30 MCG: CPT

## 2021-05-12 PROCEDURE — 91300 SARS-COV-2 / COVID-19 MRNA VACCINE (PFIZER-BIONTECH) 30 MCG: CPT

## 2021-08-01 DIAGNOSIS — E06.3 HYPOTHYROIDISM DUE TO HASHIMOTO'S THYROIDITIS: ICD-10-CM

## 2021-08-01 DIAGNOSIS — E03.8 HYPOTHYROIDISM DUE TO HASHIMOTO'S THYROIDITIS: ICD-10-CM

## 2021-08-02 RX ORDER — LEVOTHYROXINE SODIUM 0.15 MG/1
TABLET ORAL
Qty: 30 TABLET | Refills: 1 | Status: SHIPPED | OUTPATIENT
Start: 2021-08-02 | End: 2021-10-25 | Stop reason: SDUPTHER

## 2021-10-25 DIAGNOSIS — E03.8 HYPOTHYROIDISM DUE TO HASHIMOTO'S THYROIDITIS: ICD-10-CM

## 2021-10-25 DIAGNOSIS — E06.3 HYPOTHYROIDISM DUE TO HASHIMOTO'S THYROIDITIS: ICD-10-CM

## 2021-10-25 RX ORDER — LEVOTHYROXINE SODIUM 0.15 MG/1
150 TABLET ORAL
Qty: 30 TABLET | Refills: 0 | Status: SHIPPED | OUTPATIENT
Start: 2021-10-25 | End: 2021-10-25 | Stop reason: SDUPTHER

## 2021-10-25 RX ORDER — LEVOTHYROXINE SODIUM 0.15 MG/1
150 TABLET ORAL
Qty: 30 TABLET | Refills: 0 | Status: SHIPPED | OUTPATIENT
Start: 2021-10-25

## 2021-11-16 ENCOUNTER — APPOINTMENT (OUTPATIENT)
Dept: RADIOLOGY | Facility: CLINIC | Age: 26
End: 2021-11-16
Payer: COMMERCIAL

## 2021-11-16 ENCOUNTER — OFFICE VISIT (OUTPATIENT)
Dept: URGENT CARE | Facility: CLINIC | Age: 26
End: 2021-11-16
Payer: COMMERCIAL

## 2021-11-16 VITALS
BODY MASS INDEX: 48.82 KG/M2 | HEIGHT: 65 IN | TEMPERATURE: 97.5 F | HEART RATE: 73 BPM | RESPIRATION RATE: 18 BRPM | WEIGHT: 293 LBS | OXYGEN SATURATION: 98 %

## 2021-11-16 DIAGNOSIS — M25.572 ACUTE LEFT ANKLE PAIN: ICD-10-CM

## 2021-11-16 DIAGNOSIS — M25.572 ACUTE LEFT ANKLE PAIN: Primary | ICD-10-CM

## 2021-11-16 PROCEDURE — G0382 LEV 3 HOSP TYPE B ED VISIT: HCPCS | Performed by: PHYSICIAN ASSISTANT

## 2021-11-16 PROCEDURE — 73610 X-RAY EXAM OF ANKLE: CPT

## 2022-07-22 ENCOUNTER — OFFICE VISIT (OUTPATIENT)
Dept: URGENT CARE | Facility: CLINIC | Age: 27
End: 2022-07-22
Payer: COMMERCIAL

## 2022-07-22 ENCOUNTER — APPOINTMENT (OUTPATIENT)
Dept: RADIOLOGY | Facility: CLINIC | Age: 27
End: 2022-07-22
Payer: COMMERCIAL

## 2022-07-22 VITALS
WEIGHT: 293 LBS | RESPIRATION RATE: 18 BRPM | TEMPERATURE: 97.7 F | BODY MASS INDEX: 48.82 KG/M2 | OXYGEN SATURATION: 99 % | HEART RATE: 69 BPM | HEIGHT: 65 IN

## 2022-07-22 DIAGNOSIS — M79.672 LEFT FOOT PAIN: ICD-10-CM

## 2022-07-22 DIAGNOSIS — M79.672 LEFT FOOT PAIN: Primary | ICD-10-CM

## 2022-07-22 PROCEDURE — 73630 X-RAY EXAM OF FOOT: CPT

## 2022-07-22 PROCEDURE — 99213 OFFICE O/P EST LOW 20 MIN: CPT

## 2022-07-22 NOTE — PATIENT INSTRUCTIONS
Wear surgical shoe until final radiology read comes back  Check my chart for results  Ice for 15min, 3-4x daily  Tylenol/Motrin as needed for pain    PCP follow up  Follow up with podiatry if positive for fracture of if pain does not improve over the next 5-7 days  Proceed to the ER with new or worsening symptoms such as pain, swelling, loss of color or sensation

## 2022-07-22 NOTE — PROGRESS NOTES
[facility]        NAME: Yael Robb is a 32 y o  female  : 1995    MRN: [id number]  DATE: 2022  TIME: 6:13 PM    Assessment and Plan   Left foot pain [M79 672]  1  Left foot pain  XR foot 3+ vw left    Ambulatory Referral to Podiatry     Xray reviewed showing possible fracture to base L 5th MTP joint  Await final radiology read  Placed in surgical shoe and referred to podiatry  Patient Instructions     Wear surgical shoe until final radiology read comes back  Check my chart for results  Ice for 15min, 3-4x daily  Tylenol/Motrin as needed for pain    PCP follow up  Follow up with podiatry if positive for fracture of if pain does not improve over the next 5-7 days  Proceed to the ER with new or worsening symptoms such as pain, swelling, loss of color or sensation  Chief Complaint     Chief Complaint   Patient presents with    Foot Pain     Left, started 1 week ago, no injury          History of Present Illness       The patient presents today with complaints of L foot pain x 1 week without injury  She denies injury but states she stands a lot at work  She has noticed increased pain in the morning  The pain is the same regardless of weight bearing status and is located to the outer aspect of her foot and on the plantar aspect in the arch  She denies history of fracture or surgeries to the L foot, but noted she has a history of stress fracture in her R foot  She has taken 800 mg ibuprofen with some relief  Review of Systems   Review of Systems   Constitutional: Negative for chills, fatigue and fever  HENT: Negative for congestion  Eyes: Negative  Respiratory: Negative for cough and shortness of breath  Cardiovascular: Negative for chest pain and palpitations  Gastrointestinal: Negative for abdominal pain, diarrhea, nausea and vomiting  Genitourinary: Negative for difficulty urinating  Musculoskeletal: Positive for arthralgias (L outer foot)   Negative for myalgias  Skin: Negative for rash  Allergic/Immunologic: Negative for environmental allergies  Neurological: Negative for dizziness and headaches  Psychiatric/Behavioral: Negative  Current Medications       Current Outpatient Medications:     levothyroxine 150 mcg tablet, Take 1 tablet (150 mcg total) by mouth daily in the early morning (Patient not taking: Reported on 7/22/2022), Disp: 30 tablet, Rfl: 0    Current Facility-Administered Medications:     nystatin (MYCOSTATIN) powder, , Topical, BID, Tiffani Ron MD    Current Allergies     Allergies as of 07/22/2022 - Reviewed 07/22/2022   Allergen Reaction Noted    Metformin and related GI Intolerance 06/21/2016            The following portions of the patient's history were reviewed and updated as appropriate: allergies, current medications, past family history, past medical history, past social history, past surgical history and problem list      Past Medical History:   Diagnosis Date    Anxiety     Disease of thyroid gland     hypothyroid    Hashimoto's disease     Morbid obesity with BMI of 50 0-59 9, adult ([facility] [address] )     PCOS (polycystic ovarian syndrome)        Past Surgical History:   Procedure Laterality Date    PILONIDAL CYST EXCISION         Family History   Problem Relation Age of Onset    Hypertension Mother     Thyroid disease Father     Depression Father     Breast cancer Maternal Grandmother     Liver cancer Maternal Grandmother     Depression Brother     Diabetes Neg Hx     Heart disease Neg Hx     Stroke Neg Hx          Medications have been verified  Objective   Pulse 69   Temp 97 7 °F (36 5 °C) (Temporal)   Resp 18   Ht 5' 5" (1 651 m)   Wt (!) 154 kg (340 lb)   LMP 07/11/2022 (Approximate)   SpO2 99%   BMI 56 58 kg/m²        Physical Exam     Physical Exam  Vitals and nursing note reviewed  Constitutional:       General: She is not in acute distress  Appearance: Normal appearance   She is not ill-appearing  HENT:      Head: Normocephalic and atraumatic  Right Ear: External ear normal       Left Ear: External ear normal       Nose: Nose normal       Mouth/Throat:      Lips: Pink  Mouth: Mucous membranes are moist       Pharynx: Oropharynx is clear  Tonsils: No tonsillar exudate  Eyes:      General: Vision grossly intact  Extraocular Movements: Extraocular movements intact  Pupils: Pupils are equal, round, and reactive to light  Cardiovascular:      Rate and Rhythm: Normal rate and regular rhythm  Heart sounds: Normal heart sounds  No murmur heard  Pulmonary:      Effort: Pulmonary effort is normal       Breath sounds: Normal breath sounds  Abdominal:      General: Abdomen is flat  Bowel sounds are normal       Palpations: Abdomen is soft  Musculoskeletal:         General: Normal range of motion  Cervical back: Normal range of motion  Skin:     General: Skin is warm  Findings: No rash  Neurological:      Mental Status: She is alert and oriented to person, place, and time  Motor: Motor function is intact     Psychiatric:         Attention and Perception: Attention normal          Mood and Affect: Mood normal

## 2022-08-13 ENCOUNTER — HOSPITAL ENCOUNTER (EMERGENCY)
Facility: HOSPITAL | Age: 27
Discharge: HOME | End: 2022-08-13
Attending: EMERGENCY MEDICINE | Admitting: EMERGENCY MEDICINE

## 2022-08-13 ENCOUNTER — APPOINTMENT (EMERGENCY)
Dept: RADIOLOGY | Facility: HOSPITAL | Age: 27
End: 2022-08-13

## 2022-08-13 VITALS
HEART RATE: 71 BPM | HEIGHT: 65 IN | TEMPERATURE: 97.9 F | SYSTOLIC BLOOD PRESSURE: 152 MMHG | BODY MASS INDEX: 48.82 KG/M2 | OXYGEN SATURATION: 98 % | DIASTOLIC BLOOD PRESSURE: 72 MMHG | WEIGHT: 293 LBS | RESPIRATION RATE: 20 BRPM

## 2022-08-13 DIAGNOSIS — M54.2 NECK PAIN: Primary | ICD-10-CM

## 2022-08-13 PROCEDURE — 72050 X-RAY EXAM NECK SPINE 4/5VWS: CPT

## 2022-08-13 PROCEDURE — 99283 EMERGENCY DEPT VISIT LOW MDM: CPT

## 2022-08-13 PROCEDURE — 96372 THER/PROPH/DIAG INJ SC/IM: CPT

## 2022-08-13 PROCEDURE — 99284 EMERGENCY DEPT VISIT MOD MDM: CPT | Performed by: EMERGENCY MEDICINE

## 2022-08-13 RX ORDER — METHOCARBAMOL 500 MG/1
500 TABLET, FILM COATED ORAL 2 TIMES DAILY
Qty: 20 TABLET | Refills: 0 | Status: SHIPPED | OUTPATIENT
Start: 2022-08-13

## 2022-08-13 RX ORDER — KETOROLAC TROMETHAMINE 30 MG/ML
30 INJECTION, SOLUTION INTRAMUSCULAR; INTRAVENOUS ONCE
Status: COMPLETED | OUTPATIENT
Start: 2022-08-13 | End: 2022-08-13

## 2022-08-13 RX ADMIN — KETOROLAC TROMETHAMINE 30 MG: 30 INJECTION, SOLUTION INTRAMUSCULAR at 11:31

## 2022-08-13 NOTE — Clinical Note
Norberto Castañeda was seen and treated in our emergency department on 8/13/2022  Diagnosis: neck pain    Chrissy    She may return on this date: 08/17/2022         If you have any questions or concerns, please don't hesitate to call        Antonio Hernandez MD    ______________________________           _______________          _______________  Hospital Representative                              Date                                Time

## 2022-08-13 NOTE — ED PROVIDER NOTES
History  Chief Complaint   Patient presents with    Neck Pain     Pt presents with right sided neck pain and shoulder pain that started last Friday 8/5  Pt denies SOB, CP  Pt AAOx4  No distress noted  HPI  33 yo F presents with R neck pain that radiates to R shoulder, started Friday 8/5 after lying on table while getting tattoo  Pain is aching, moderate and worse with movement  Has been taking aleve with some improvement in addition to lidocaine patches  Prior to Admission Medications   Prescriptions Last Dose Informant Patient Reported? Taking?   levothyroxine 150 mcg tablet   No No   Sig: Take 1 tablet (150 mcg total) by mouth daily in the early morning   Patient not taking: Reported on 7/22/2022      Facility-Administered Medications Last Administration Doses Remaining   nystatin (MYCOSTATIN) powder None recorded           Past Medical History:   Diagnosis Date    Anxiety     Disease of thyroid gland     hypothyroid    Hashimoto's disease     Morbid obesity with BMI of 50 0-59 9, adult (HCC)     PCOS (polycystic ovarian syndrome)        Past Surgical History:   Procedure Laterality Date    PILONIDAL CYST EXCISION         Family History   Problem Relation Age of Onset    Hypertension Mother     Thyroid disease Father     Depression Father     Breast cancer Maternal Grandmother     Liver cancer Maternal Grandmother     Depression Brother     Diabetes Neg Hx     Heart disease Neg Hx     Stroke Neg Hx      I have reviewed and agree with the history as documented      E-Cigarette/Vaping    E-Cigarette Use Never User      E-Cigarette/Vaping Substances    Nicotine No     THC No     CBD No     Flavoring No     Other No     Unknown No      Social History     Tobacco Use    Smoking status: Never Smoker    Smokeless tobacco: Never Used   Vaping Use    Vaping Use: Never used   Substance Use Topics    Alcohol use: Yes     Comment: rarely    Drug use: Yes     Types: Marijuana       Review of Systems   Constitutional: Negative for chills and fever  HENT: Negative for dental problem and ear pain  Eyes: Negative for pain and redness  Respiratory: Negative for cough and shortness of breath  Cardiovascular: Negative for chest pain and palpitations  Gastrointestinal: Negative for abdominal pain and nausea  Endocrine: Negative for polydipsia and polyphagia  Genitourinary: Negative for dysuria and frequency  Musculoskeletal: Positive for neck pain  Negative for arthralgias and joint swelling  Skin: Negative for color change and rash  Neurological: Negative for dizziness and headaches  Psychiatric/Behavioral: Negative for behavioral problems and confusion  All other systems reviewed and are negative  Physical Exam  Physical Exam  Vitals and nursing note reviewed  Constitutional:       General: She is not in acute distress  HENT:      Head: Normocephalic and atraumatic  Right Ear: External ear normal       Left Ear: External ear normal       Nose: Nose normal    Eyes:      General: No scleral icterus  Cardiovascular:      Rate and Rhythm: Normal rate  Pulmonary:      Effort: Pulmonary effort is normal  No respiratory distress  Abdominal:      General: There is no distension  Musculoskeletal:         General: No deformity  Normal range of motion  Comments: R paraspinal cervical tenderness   Skin:     Findings: No rash  Neurological:      General: No focal deficit present  Mental Status: She is alert        Gait: Gait normal    Psychiatric:         Mood and Affect: Mood normal          Vital Signs  ED Triage Vitals [08/13/22 1112]   Temperature Pulse Respirations Blood Pressure SpO2   97 9 °F (36 6 °C) 84 18 (!) 181/91 99 %      Temp Source Heart Rate Source Patient Position - Orthostatic VS BP Location FiO2 (%)   Oral Monitor Sitting Right arm --      Pain Score       7           Vitals:    08/13/22 1112 08/13/22 1145 08/13/22 1200   BP: (!) 181/91 150/67 152/72   Pulse: 84 77 71   Patient Position - Orthostatic VS: Sitting  Sitting         Visual Acuity      ED Medications  Medications   ketorolac (TORADOL) injection 30 mg (30 mg Intramuscular Given 8/13/22 1131)       Diagnostic Studies  Results Reviewed     None                 XR spine cervical complete 4 or 5 vw non injury    (Results Pending)              Procedures  Procedures         ED Course                               SBIRT 20yo+    Flowsheet Row Most Recent Value   SBIRT (25 yo +)    In order to provide better care to our patients, we are screening all of our patients for alcohol and drug use  Would it be okay to ask you these screening questions? No Filed at: 08/13/2022 1118                    MDM  31 yo F presents with neck pain after lying on uncomfortable table during tattoo, XR shows no acute abnormality per my read, discussed supportive care and referral to comprehensive spine  Disposition  Final diagnoses:   Neck pain     Time reflects when diagnosis was documented in both MDM as applicable and the Disposition within this note     Time User Action Codes Description Comment    8/13/2022 12:11 PM Victorina Alvarez [M54 2] Neck pain       ED Disposition     ED Disposition   Discharge    Condition   Stable    Date/Time   Sat Aug 13, 2022 12:11 PM    [facility] discharge to home/self care                 Follow-up Information    None         Discharge Medication List as of 8/13/2022 12:12 PM      START taking these medications    Details   methocarbamol (ROBAXIN) 500 mg tablet Take 1 tablet (500 mg total) by mouth 2 (two) times a day, Starting Sat 8/13/2022, Normal         CONTINUE these medications which have NOT CHANGED    Details   levothyroxine 150 mcg tablet Take 1 tablet (150 mcg total) by mouth daily in the early morning, Starting Mon 10/25/2021, Normal                 PDMP Review     None          ED Provider  Electronically Signed by           Mahesh Dietrich MD  08/13/22 0169

## 2022-08-15 ENCOUNTER — TELEPHONE (OUTPATIENT)
Dept: PHYSICAL THERAPY | Facility: OTHER | Age: 27
End: 2022-08-15

## 2022-10-23 NOTE — TELEPHONE ENCOUNTER
COVID Pre-Visit Screening     1  Is this a family member screening? NO:[id number]}  2  Have you traveled outside of your state in the past 2 weeks? [place]:20833}  3  Do you presently have a fever or flu-like symptoms? NO:64663}  4  Do you have symptoms of an upper respiratory infection like runny nose, sore throat, or cough? NO:09709}  5  Are you suffering from new headache that you have not had in the past?  NO:39826}  6  Do you have/have you experienced any new shortness of breath recently? NO:88600}  7  Do you have any new diarrhea, nausea or vomiting? NO:62477}  8  Have you been in contact with anyone who has been sick or diagnosed with COVID-19? NO:95607}  9  Do you have any new loss of taste or smell? NO:00618}  10  Are you able to wear a mask without a valve for the entire visit?  YES self-care/home management/community/leisure

## 2023-01-09 ENCOUNTER — OFFICE VISIT (OUTPATIENT)
Dept: FAMILY MEDICINE CLINIC | Facility: CLINIC | Age: 28
End: 2023-01-09

## 2023-01-09 ENCOUNTER — APPOINTMENT (OUTPATIENT)
Dept: RADIOLOGY | Facility: CLINIC | Age: 28
End: 2023-01-09

## 2023-01-09 VITALS
OXYGEN SATURATION: 98 % | TEMPERATURE: 97.6 F | SYSTOLIC BLOOD PRESSURE: 122 MMHG | BODY MASS INDEX: 59.47 KG/M2 | HEIGHT: 65 IN | HEART RATE: 106 BPM | DIASTOLIC BLOOD PRESSURE: 84 MMHG

## 2023-01-09 DIAGNOSIS — R05.2 SUBACUTE COUGH: Primary | ICD-10-CM

## 2023-01-09 DIAGNOSIS — Z23 ENCOUNTER FOR IMMUNIZATION: ICD-10-CM

## 2023-01-09 DIAGNOSIS — E06.3 HYPOTHYROIDISM DUE TO HASHIMOTO'S THYROIDITIS: ICD-10-CM

## 2023-01-09 DIAGNOSIS — E03.8 HYPOTHYROIDISM DUE TO HASHIMOTO'S THYROIDITIS: ICD-10-CM

## 2023-01-09 RX ORDER — ALBUTEROL SULFATE 90 UG/1
AEROSOL, METERED RESPIRATORY (INHALATION)
COMMUNITY
Start: 2023-01-05

## 2023-01-09 RX ORDER — DEXTROMETHORPHAN HYDROBROMIDE AND PROMETHAZINE HYDROCHLORIDE 15; 6.25 MG/5ML; MG/5ML
SYRUP ORAL
COMMUNITY
Start: 2023-01-05

## 2023-01-09 RX ORDER — LEVOTHYROXINE SODIUM 0.15 MG/1
150 TABLET ORAL
Qty: 30 TABLET | Refills: 1 | Status: SHIPPED | OUTPATIENT
Start: 2023-01-09

## 2023-01-09 RX ORDER — BENZONATATE 200 MG/1
CAPSULE ORAL
COMMUNITY
Start: 2023-01-05

## 2023-01-09 RX ORDER — PREDNISONE 20 MG/1
TABLET ORAL
COMMUNITY
Start: 2023-01-05

## 2023-01-09 NOTE — PROGRESS NOTES
Assessment/Plan:         Problem List Items Addressed This Visit        Endocrine    Hypothyroidism due to Hashimoto's thyroiditis    Relevant Medications    predniSONE 20 mg tablet    levothyroxine 150 mcg tablet    Other Relevant Orders    TSH, 3rd generation with Free T4 reflex       Other    Encounter for immunization    Subacute cough - Primary     Will check chest x-ray to further evaluation          Relevant Orders    XR chest pa & lateral         Subjective:      Patient ID: Sherry Stroud is a 32 y o  female  Patient here today and reports that for the past five weeks she has been having a persistent cough symptoms started when she was at work and just progressing from there no fevers and was congestion in her sinuses no COVID or Flu and was brining up yellow mucus  And also having a headache in the right side of her head and achy  Went to an UC last week and was given a steroid and cough medication and inhaler no asthma history or smoking the steroid makes her feel sweaty and more anxious taking  The following portions of the patient's history were reviewed and updated as appropriate:   She  has a past medical history of Anxiety, Disease of thyroid gland, Hashimoto's disease, Morbid obesity with BMI of 50 0-59 9, adult ([address] ), and PCOS (polycystic ovarian syndrome)    She   Patient Active Problem List    Diagnosis Date Noted   • Encounter for immunization 01/09/2023   • Subacute cough 01/09/2023   • Hypothyroidism due to Hashimoto's thyroiditis 08/04/2020   • Morbid obesity with BMI of 50 0-59 9, adult ([address] ) 08/04/2020   • Lipid screening 08/04/2020   • Iron deficiency anemia secondary to inadequate dietary iron intake 08/04/2020   • Heart palpitations 08/04/2020   • Other chest pain 08/04/2020   • Abnormal bleeding in menstrual cycle 08/04/2020   • H/O fungal skin infection 08/04/2020   • Pannus, abdominal 08/04/2020   • PCOS (polycystic ovarian syndrome) 10/02/2019   • Oligomenorrhea 05/22/2017 • Tinea corporis 06/21/2016   • Vitamin D deficiency 10/25/2013     She  has a past surgical history that includes PILONIDAL CYST EXCISION  Her family history includes Breast cancer in her maternal grandmother; Depression in her brother and father; Hypertension in her mother; Liver cancer in her maternal grandmother; Thyroid disease in her father  She  reports that she has never smoked  She has never used smokeless tobacco  She reports current alcohol use  She reports current drug use  Drug: Marijuana  Current Outpatient Medications   Medication Sig Dispense Refill   • levothyroxine 150 mcg tablet Take 1 tablet (150 mcg total) by mouth daily in the early morning 30 tablet 1   • albuterol (PROVENTIL HFA,VENTOLIN HFA) 90 mcg/act inhaler inhale 2 puffs by mouth and INTO THE LUNGS every 4 to 6 hours for 10 days     • benzonatate (TESSALON) 200 MG capsule take 1 capsule by mouth three times a day for 10 days     • methocarbamol (ROBAXIN) 500 mg tablet Take 1 tablet (500 mg total) by mouth 2 (two) times a day 20 tablet 0   • predniSONE 20 mg tablet take 3 tablets by mouth once daily for 7 days     • promethazine-dextromethorphan (PHENERGAN-DM) 6 25-15 mg/5 mL oral syrup take 5 milliliters (1 TEASPOONFUL) by mouth every evening for 10 days       Current Facility-Administered Medications   Medication Dose Route Frequency Provider Last Rate Last Admin   • nystatin (MYCOSTATIN) powder   Topical BID Liv Lindsey MD            Review of Systems   Constitutional: Negative for activity change, appetite change, chills, diaphoresis, fatigue, fever and unexpected weight change  HENT: Positive for congestion, sinus pressure and sinus pain  Negative for ear pain, hearing loss, postnasal drip, sneezing and sore throat  Eyes: Negative for pain, redness and visual disturbance  Respiratory: Positive for cough, chest tightness and shortness of breath  Negative for apnea, choking, wheezing and stridor  Cardiovascular: Negative for chest pain and leg swelling  Gastrointestinal: Negative for abdominal pain, diarrhea, nausea and vomiting  Endocrine: Negative  Genitourinary: Negative  Musculoskeletal: Negative for arthralgias  Skin: Negative  Allergic/Immunologic: Negative  Neurological: Negative for dizziness and light-headedness  Hematological: Negative  Psychiatric/Behavioral: Negative for behavioral problems and dysphoric mood  Objective:      /84 (BP Location: Left arm, Patient Position: Sitting, Cuff Size: Large)   Pulse (!) 106   Temp 97 6 °F (36 4 °C)   Ht 5' 5" (1 651 m)   SpO2 98%   BMI 59 47 kg/m²          Physical Exam  Vitals and nursing note reviewed  Constitutional:       General: She is not in acute distress  Appearance: She is well-developed  HENT:      Head: Normocephalic and atraumatic  Right Ear: Tympanic membrane normal       Left Ear: Tympanic membrane normal       Nose: Nose normal       Mouth/Throat:      Mouth: Mucous membranes are moist    Eyes:      Pupils: Pupils are equal, round, and reactive to light  Neck:      Thyroid: No thyromegaly  Cardiovascular:      Rate and Rhythm: Normal rate and regular rhythm  Heart sounds: Normal heart sounds  No murmur heard  Pulmonary:      Effort: Pulmonary effort is normal  No respiratory distress  Breath sounds: Rhonchi present  No wheezing  Abdominal:      General: Bowel sounds are normal       Palpations: Abdomen is soft  Musculoskeletal:         General: Normal range of motion  Cervical back: Normal range of motion  Skin:     General: Skin is warm and dry  Neurological:      General: No focal deficit present  Mental Status: She is alert and oriented to person, place, and time  Psychiatric:         Mood and Affect: Mood normal          Behavior: Behavior normal          Thought Content:  Thought content normal          Judgment: Judgment normal

## 2023-01-13 ENCOUNTER — TELEPHONE (OUTPATIENT)
Dept: FAMILY MEDICINE CLINIC | Facility: CLINIC | Age: 28
End: 2023-01-13

## 2023-01-13 DIAGNOSIS — R05.2 SUBACUTE COUGH: Primary | ICD-10-CM

## 2023-01-13 RX ORDER — FLUTICASONE PROPIONATE 110 UG/1
4 AEROSOL, METERED RESPIRATORY (INHALATION) 2 TIMES DAILY
Qty: 12 G | Refills: 0 | Status: SHIPPED | OUTPATIENT
Start: 2023-01-13 | End: 2023-01-27

## 2023-01-13 NOTE — TELEPHONE ENCOUNTER
Pt's cough not improving  Cough syrup/benzonatate not effective  Inhaler has minimal effect  Please advise

## 2023-01-31 ENCOUNTER — RA CDI HCC (OUTPATIENT)
Dept: OTHER | Facility: HOSPITAL | Age: 28
End: 2023-01-31

## 2023-01-31 NOTE — PROGRESS NOTES
NOT on the BPA- please assess using MEAT for 2023 billing    [address]  coding opportunities          Chart Reviewed number of suggestions sent to Provider: 1     Patients Insurance        Commercial Insurance: [address] [place] [address]

## 2023-02-06 ENCOUNTER — APPOINTMENT (OUTPATIENT)
Dept: LAB | Facility: HOSPITAL | Age: 28
End: 2023-02-06

## 2023-02-06 DIAGNOSIS — E06.3 HYPOTHYROIDISM DUE TO HASHIMOTO'S THYROIDITIS: ICD-10-CM

## 2023-02-06 DIAGNOSIS — E03.8 HYPOTHYROIDISM DUE TO HASHIMOTO'S THYROIDITIS: ICD-10-CM

## 2023-02-06 LAB — TSH SERPL DL<=0.05 MIU/L-ACNC: 1.09 UIU/ML (ref 0.45–4.5)

## 2023-02-07 ENCOUNTER — OFFICE VISIT (OUTPATIENT)
Dept: FAMILY MEDICINE CLINIC | Facility: CLINIC | Age: 28
End: 2023-02-07

## 2023-02-07 VITALS
BODY MASS INDEX: 59.47 KG/M2 | HEIGHT: 65 IN | OXYGEN SATURATION: 97 % | SYSTOLIC BLOOD PRESSURE: 136 MMHG | DIASTOLIC BLOOD PRESSURE: 88 MMHG | HEART RATE: 99 BPM

## 2023-02-07 DIAGNOSIS — Z00.00 ANNUAL PHYSICAL EXAM: Primary | ICD-10-CM

## 2023-02-07 DIAGNOSIS — E03.8 HYPOTHYROIDISM DUE TO HASHIMOTO'S THYROIDITIS: ICD-10-CM

## 2023-02-07 DIAGNOSIS — D50.8 IRON DEFICIENCY ANEMIA SECONDARY TO INADEQUATE DIETARY IRON INTAKE: ICD-10-CM

## 2023-02-07 DIAGNOSIS — E66.01 MORBID OBESITY WITH BMI OF 50.0-59.9, ADULT (HCC): ICD-10-CM

## 2023-02-07 DIAGNOSIS — E06.3 HYPOTHYROIDISM DUE TO HASHIMOTO'S THYROIDITIS: ICD-10-CM

## 2023-02-07 DIAGNOSIS — E55.9 VITAMIN D DEFICIENCY: ICD-10-CM

## 2023-02-07 DIAGNOSIS — Z23 NEED FOR TDAP VACCINATION: ICD-10-CM

## 2023-02-07 RX ORDER — LEVOTHYROXINE SODIUM 0.15 MG/1
150 TABLET ORAL
Qty: 90 TABLET | Refills: 1 | Status: SHIPPED | OUTPATIENT
Start: 2023-02-07 | End: 2023-05-08

## 2023-02-07 NOTE — PATIENT INSTRUCTIONS

## 2023-02-07 NOTE — PROGRESS NOTES
ADULT ANNUAL [facility]    NAME: Karmen Wang  AGE: 32 y o  SEX: female  : 1995     DATE: 2023     Assessment and Plan:     Problem List Items Addressed This Visit        Endocrine    Hypothyroidism due to Hashimoto's thyroiditis    Relevant Medications    levothyroxine 150 mcg tablet       Other    Morbid obesity with BMI of 50 0-59 9, adult (HCC)    Iron deficiency anemia secondary to inadequate dietary iron intake    Relevant Orders    Comprehensive metabolic panel    CBC and differential    Iron Panel (Includes Ferritin, Iron Sat%, Iron, and TIBC)    Vitamin D deficiency    Relevant Orders    Vitamin D 25 hydroxy    Annual physical exam - Primary       Immunizations and preventive care screenings were discussed with patient today  Appropriate education was printed on patient's after visit summary  Counseling:  Dental Health: discussed importance of regular tooth brushing, flossing, and dental visits  Exercise: the importance of regular exercise/physical activity was discussed  Recommend exercise 3-5 times per week for at least 30 minutes  BMI Counseling: Body mass index is 59 47 kg/m²  The BMI is above normal  Nutrition recommendations include decreasing portion sizes, encouraging healthy choices of fruits and vegetables, decreasing fast food intake, consuming healthier snacks, limiting drinks that contain sugar, moderation in carbohydrate intake, increasing intake of lean protein, reducing intake of saturated and trans fat and reducing intake of cholesterol  Exercise recommendations include moderate physical activity 150 minutes/week  No pharmacotherapy was ordered  Patient referred to PCP  Rationale for BMI follow-up plan is due to patient being overweight or obese  Depression Screening and Follow-up Plan: Patient was screened for depression during today's encounter   They screened negative with a PHQ-2 score of 0         Return in 1 year (on 2/7/2024)  Chief Complaint:     Chief Complaint   Patient presents with   • Follow-up     Thyroid check - lab review       History of Present Illness:     Adult Annual Physical   Patient here for a comprehensive physical exam  The patient reports problems - having a cough and had steroids and an inhaler and when she used the inhaler   Diet and Physical Activity  Diet/Nutrition: well balanced diet  Exercise: walking  Depression Screening  PHQ-2/9 Depression Screening    Little interest or pleasure in doing things: 0 - not at all  Feeling down, depressed, or hopeless: 0 - not at all  PHQ-2 Score: 0  PHQ-2 Interpretation: Negative depression screen       General Health  Sleep: sleeps well  Hearing: normal - bilateral   Vision: goes for regular eye exams, most recent eye exam <1 year ago and wears glasses  Dental: no dental visits for >1 year and brushes teeth twice daily  /GYN Health  Last menstrual period: 2/1/2023  Contraceptive method: barrier methods  History of STDs?: no      Review of Systems:     Review of Systems   Constitutional: Negative for activity change, appetite change, chills, diaphoresis, fatigue, fever and unexpected weight change  HENT: Negative for congestion, ear pain, hearing loss, postnasal drip, sinus pressure, sinus pain, sneezing and sore throat  Eyes: Negative for pain, redness and visual disturbance  Respiratory: Positive for cough  Negative for shortness of breath  Yellow phlegm    Cardiovascular: Negative for chest pain and leg swelling  Gastrointestinal: Negative for abdominal pain, diarrhea, nausea and vomiting  Endocrine: Negative  Genitourinary: Negative  Musculoskeletal: Negative for arthralgias  Allergic/Immunologic: Negative  Neurological: Negative for dizziness and light-headedness  Hematological: Negative  Psychiatric/Behavioral: Negative for behavioral problems and dysphoric mood  Past Medical History:     Past Medical History:   Diagnosis Date   • Anxiety    • Disease of thyroid gland     hypothyroid   • Hashimoto's disease    • Morbid obesity with BMI of 50 0-59 9, adult (HCC)    • PCOS (polycystic ovarian syndrome)       Past Surgical History:     Past Surgical History:   Procedure Laterality Date   • PILONIDAL CYST EXCISION        Social History:     Social History     Socioeconomic History   • Marital status: Single     Spouse name: Not on file   • Number of children: Not on file   • Years of education: Not on file   • Highest education level: Not on file   Occupational History   • Occupation: unemployed   Tobacco Use   • Smoking status: Never   • Smokeless tobacco: Never   Vaping Use   • Vaping Use: Never used   Substance and Sexual Activity   • Alcohol use: Yes     Comment: rarely   • Drug use: Yes     Types: Marijuana   • Sexual activity: Yes     Partners: Male     Birth control/protection: Condom   Other Topics Concern   • Not on file   Social History Narrative   • Not on file     Social Determinants of Health     Financial Resource Strain: Not on file   Food Insecurity: Not on file   Transportation Needs: Not on file   Physical Activity: Not on file   Stress: Not on file   Social Connections: Not on file   Intimate Partner Violence: Not on file   Housing Stability: Not on file      Family History:     Family History   Problem Relation Age of Onset   • Hypertension Mother    • Thyroid disease Father    • Depression Father    • Breast cancer Maternal Grandmother    • Liver cancer Maternal Grandmother    • Depression Brother    • Diabetes Neg Hx    • Heart disease Neg Hx    • Stroke Neg Hx       Current Medications:     Current Outpatient Medications   Medication Sig Dispense Refill   • levothyroxine 150 mcg tablet Take 1 tablet (150 mcg total) by mouth daily in the early morning 90 tablet 1   • methocarbamol (ROBAXIN) 500 mg tablet Take 1 tablet (500 mg total) by mouth 2 (two) times a day 20 tablet 0   • fluticasone (FLOVENT HFA) 110 MCG/ACT inhaler Inhale 4 puffs 2 (two) times a day for 14 days Rinse mouth after use  12 g 0     Current Facility-Administered Medications   Medication Dose Route Frequency Provider Last Rate Last Admin   • nystatin (MYCOSTATIN) powder   Topical BID Martha Ng MD          Allergies: Allergies   Allergen Reactions   • Metformin And Related GI Intolerance      Physical Exam:     /88   Pulse 99   Ht 5' 5" (1 651 m)   SpO2 97%   BMI 59 47 kg/m²     Physical Exam  Vitals and nursing note reviewed  Constitutional:       General: She is not in acute distress  Appearance: She is well-developed  HENT:      Head: Normocephalic and atraumatic  Eyes:      Conjunctiva/sclera: Conjunctivae normal    Cardiovascular:      Rate and Rhythm: Normal rate and regular rhythm  Heart sounds: No murmur heard  Pulmonary:      Effort: Pulmonary effort is normal  No respiratory distress  Breath sounds: Normal breath sounds  Abdominal:      Palpations: Abdomen is soft  Tenderness: There is no abdominal tenderness  Musculoskeletal:         General: No swelling  Cervical back: Neck supple  Skin:     General: Skin is warm and dry  Capillary Refill: Capillary refill takes less than 2 seconds  Neurological:      Mental Status: She is alert     Psychiatric:         Mood and Affect: Mood normal           Jose Marin

## 2023-03-10 PROBLEM — R05.2 SUBACUTE COUGH: Status: RESOLVED | Noted: 2023-01-09 | Resolved: 2023-03-10

## 2023-07-27 ENCOUNTER — RA CDI HCC (OUTPATIENT)
Dept: OTHER | Facility: HOSPITAL | Age: 28
End: 2023-07-27

## 2023-07-27 NOTE — PROGRESS NOTES
[facility] coding opportunities       Chart reviewed, no opportunity found: CHART REVIEWED, NO OPPORTUNITY FOUND        Patients Insurance        Commercial Insurance: Artis Robb

## 2023-09-27 DIAGNOSIS — E06.3 HYPOTHYROIDISM DUE TO HASHIMOTO'S THYROIDITIS: ICD-10-CM

## 2023-09-27 DIAGNOSIS — E03.8 HYPOTHYROIDISM DUE TO HASHIMOTO'S THYROIDITIS: ICD-10-CM

## 2023-09-27 RX ORDER — LEVOTHYROXINE SODIUM 0.15 MG/1
TABLET ORAL
Qty: 30 TABLET | Refills: 4 | Status: SHIPPED | OUTPATIENT
Start: 2023-09-27

## 2024-02-21 PROBLEM — Z13.220 LIPID SCREENING: Status: RESOLVED | Noted: 2020-08-04 | Resolved: 2024-02-21

## 2024-03-25 DIAGNOSIS — E06.3 HYPOTHYROIDISM DUE TO HASHIMOTO'S THYROIDITIS: ICD-10-CM

## 2024-03-25 DIAGNOSIS — E03.8 HYPOTHYROIDISM DUE TO HASHIMOTO'S THYROIDITIS: ICD-10-CM

## 2024-03-25 RX ORDER — LEVOTHYROXINE SODIUM 0.15 MG/1
TABLET ORAL
Qty: 30 TABLET | Refills: 4 | Status: SHIPPED | OUTPATIENT
Start: 2024-03-25

## 2024-11-24 DIAGNOSIS — E06.3 HYPOTHYROIDISM DUE TO HASHIMOTO'S THYROIDITIS: ICD-10-CM

## 2024-11-26 DIAGNOSIS — E06.3 HYPOTHYROIDISM DUE TO HASHIMOTO'S THYROIDITIS: Primary | ICD-10-CM

## 2024-11-26 RX ORDER — LEVOTHYROXINE SODIUM 150 UG/1
TABLET ORAL
Qty: 30 TABLET | Refills: 4 | Status: SHIPPED | OUTPATIENT
Start: 2024-11-26

## 2024-12-30 ENCOUNTER — HOSPITAL ENCOUNTER (EMERGENCY)
Facility: HOSPITAL | Age: 29
Discharge: HOME | End: 2024-12-30
Attending: EMERGENCY MEDICINE
Payer: COMMERCIAL

## 2024-12-30 ENCOUNTER — APPOINTMENT (EMERGENCY)
Dept: RADIOLOGY | Facility: HOSPITAL | Age: 29
End: 2024-12-30
Payer: COMMERCIAL

## 2024-12-30 VITALS
OXYGEN SATURATION: 98 % | SYSTOLIC BLOOD PRESSURE: 171 MMHG | DIASTOLIC BLOOD PRESSURE: 95 MMHG | HEART RATE: 97 BPM | TEMPERATURE: 98.3 F | RESPIRATION RATE: 18 BRPM

## 2024-12-30 DIAGNOSIS — S99.929A FOOT INJURY: ICD-10-CM

## 2024-12-30 DIAGNOSIS — S91.319A FOOT LACERATION: Primary | ICD-10-CM

## 2024-12-30 PROCEDURE — 12001 RPR S/N/AX/GEN/TRNK 2.5CM/<: CPT

## 2024-12-30 PROCEDURE — 99283 EMERGENCY DEPT VISIT LOW MDM: CPT

## 2024-12-30 PROCEDURE — 73630 X-RAY EXAM OF FOOT: CPT

## 2024-12-30 PROCEDURE — 99284 EMERGENCY DEPT VISIT MOD MDM: CPT

## 2024-12-30 PROCEDURE — 90715 TDAP VACCINE 7 YRS/> IM: CPT

## 2024-12-30 PROCEDURE — 90471 IMMUNIZATION ADMIN: CPT

## 2024-12-30 RX ORDER — LIDOCAINE HYDROCHLORIDE 20 MG/ML
10 INJECTION, SOLUTION EPIDURAL; INFILTRATION; INTRACAUDAL; PERINEURAL ONCE
Status: COMPLETED | OUTPATIENT
Start: 2024-12-30 | End: 2024-12-30

## 2024-12-30 RX ORDER — ACETAMINOPHEN 325 MG/1
650 TABLET ORAL ONCE
Status: COMPLETED | OUTPATIENT
Start: 2024-12-30 | End: 2024-12-30

## 2024-12-30 RX ORDER — GINSENG 100 MG
1 CAPSULE ORAL ONCE
Status: COMPLETED | OUTPATIENT
Start: 2024-12-30 | End: 2024-12-30

## 2024-12-30 RX ADMIN — BACITRACIN ZINC 1 LARGE APPLICATION: 500 OINTMENT TOPICAL at 22:12

## 2024-12-30 RX ADMIN — LIDOCAINE HYDROCHLORIDE 10 ML: 20 INJECTION, SOLUTION EPIDURAL; INFILTRATION; INTRACAUDAL; PERINEURAL at 22:12

## 2024-12-30 RX ADMIN — ACETAMINOPHEN 650 MG: 325 TABLET, FILM COATED ORAL at 22:12

## 2024-12-30 RX ADMIN — TETANUS TOXOID, REDUCED DIPHTHERIA TOXOID AND ACELLULAR PERTUSSIS VACCINE, ADSORBED 0.5 ML: 5; 2.5; 8; 8; 2.5 SUSPENSION INTRAMUSCULAR at 22:12

## 2024-12-30 NOTE — Clinical Note
Chrissy Mazariegos was seen and treated in our emergency department on 12/30/2024.    No restrictions            Diagnosis:     Chrissy  may return to work on return date.    She may return on this date: 01/03/2025         If you have any questions or concerns, please don't hesitate to call.      Mely Guerrero RN    ______________________________           _______________          _______________  Hospital Representative                              Date                                Time

## 2024-12-31 NOTE — DISCHARGE INSTRUCTIONS
Suture removal in 10 to 14 days.  Maintain good wound care.  Bacitracin twice a day.  Tylenol and Motrin for pain.    Follow-up with your PCP and return to the ER for any worsening symptoms.

## 2024-12-31 NOTE — ED PROVIDER NOTES
Time reflects when diagnosis was documented in both MDM as applicable and the Disposition within this note       Time User Action Codes Description Comment    12/30/2024 10:39 PM Abiola Genao Add [S91.319A] Foot laceration     12/30/2024 10:39 PM Abiola Genao [S99.929A] Foot injury           ED Disposition       ED Disposition   Discharge    Condition   Stable    Date/Time   Mon Dec 30, 2024 10:39 PM    Comment   Chrissy Mazariegos discharge to home/self care.                   Assessment & Plan       Medical Decision Making  Vital signs stable and patient well-appearing.  Tetanus updated.  Patient given medication for symptoms.  No acute findings on x-ray.  Laceration repaired, patient tolerated well.  Provided patient education and discussed return precautions.  Patient to follow-up with her PCP and return to the ER for any worsening symptoms.  Patient verbalizes understanding and agrees to discharge plan.  Patient also provided with written discharge instructions.    Amount and/or Complexity of Data Reviewed  Radiology: ordered and independent interpretation performed.    Risk  OTC drugs.  Prescription drug management.             Medications   tetanus-diphtheria-acellular pertussis (BOOSTRIX) IM injection 0.5 mL (0.5 mL Intramuscular Given 12/30/24 2212)   acetaminophen (TYLENOL) tablet 650 mg (650 mg Oral Given 12/30/24 2212)   lidocaine (PF) (XYLOCAINE-MPF) 2 % injection 10 mL (10 mL Infiltration Given by Other 12/30/24 2212)   bacitracin topical ointment 1 large application (1 large application Topical Given 12/30/24 2212)       ED Risk Strat Scores                          SBIRT 22yo+      Flowsheet Row Most Recent Value   Initial Alcohol Screen: US AUDIT-C     1. How often do you have a drink containing alcohol? 0 Filed at: 12/30/2024 2244   2. How many drinks containing alcohol do you have on a typical day you are drinking?  0 Filed at: 12/30/2024 2244   3b. FEMALE Any Age, or MALE 65+: How often do  you have 4 or more drinks on one occassion? 0 Filed at: 12/30/2024 2244   Audit-C Score 0 Filed at: 12/30/2024 2244   TIMUR: How many times in the past year have you...    Used an illegal drug or used a prescription medication for non-medical reasons? Never Filed at: 12/30/2024 2244                            History of Present Illness       Chief Complaint   Patient presents with    Extremity Laceration     Pt arrives c/o laceration to R great toe. Pt reports dropping glass pot on foot       Past Medical History:   Diagnosis Date    Anxiety     Disease of thyroid gland     hypothyroid    Hashimoto's disease     Morbid obesity with BMI of 50.0-59.9, adult (HCC)     PCOS (polycystic ovarian syndrome)       Past Surgical History:   Procedure Laterality Date    PILONIDAL CYST EXCISION        Family History   Problem Relation Age of Onset    Hypertension Mother     Thyroid disease Father     Depression Father     Breast cancer Maternal Grandmother     Liver cancer Maternal Grandmother     Depression Brother     Diabetes Neg Hx     Heart disease Neg Hx     Stroke Neg Hx       Social History     Tobacco Use    Smoking status: Never    Smokeless tobacco: Never   Vaping Use    Vaping status: Never Used   Substance Use Topics    Alcohol use: Yes     Comment: rarely    Drug use: Yes     Types: Marijuana      E-Cigarette/Vaping    E-Cigarette Use Never User       E-Cigarette/Vaping Substances    Nicotine No     THC No     CBD No     Flavoring No     Other No     Unknown No       I have reviewed and agree with the history as documented.     Patient is a 29-year-old female who presents to the emergency room for laceration to her right great toe.  Reports she dropped a glass pot on her right great toe prior to ER arrival.  Reports laceration with bleeding controlled.  Patient not up-to-date with tetanus.  Denies any other trauma or injury.  Denies any other symptoms.          Review of Systems   Constitutional:  Negative for  chills and fever.   HENT:  Negative for ear pain, sore throat, trouble swallowing and voice change.    Eyes:  Negative for pain and visual disturbance.   Respiratory:  Negative for cough and shortness of breath.    Cardiovascular:  Negative for chest pain and palpitations.   Gastrointestinal:  Negative for abdominal pain, nausea and vomiting.   Genitourinary:  Negative for dysuria and hematuria.   Musculoskeletal:  Negative for arthralgias and back pain.   Skin:  Positive for wound. Negative for color change and rash.   Neurological:  Negative for seizures, syncope and headaches.   Psychiatric/Behavioral:  Negative for confusion.    All other systems reviewed and are negative.          Objective       ED Triage Vitals   Temperature Pulse Blood Pressure Respirations SpO2 Patient Position - Orthostatic VS   12/30/24 2037 12/30/24 2037 12/30/24 2037 12/30/24 2037 12/30/24 2037 --   98.3 °F (36.8 °C) 97 (!) 171/95 18 98 %       Temp src Heart Rate Source BP Location FiO2 (%) Pain Score    -- 12/30/24 2037 -- -- 12/30/24 2212     Monitor   8      Vitals      Date and Time Temp Pulse SpO2 Resp BP Pain Score FACES Pain Rating User   12/30/24 2212 -- -- -- -- -- 8 -- ROHINI   12/30/24 2037 98.3 °F (36.8 °C) 97 98 % 18 171/95 -- -- ROHINI            Physical Exam  Vitals and nursing note reviewed.   Constitutional:       General: She is not in acute distress.     Appearance: Normal appearance. She is well-developed.   HENT:      Head: Normocephalic and atraumatic.   Eyes:      Conjunctiva/sclera: Conjunctivae normal.   Cardiovascular:      Rate and Rhythm: Normal rate and regular rhythm.      Pulses: Normal pulses.      Heart sounds: No murmur heard.  Pulmonary:      Effort: Pulmonary effort is normal. No respiratory distress.      Breath sounds: Normal breath sounds.   Musculoskeletal:         General: No swelling.      Cervical back: Neck supple.   Skin:     General: Skin is warm and dry.      Capillary Refill: Capillary refill  "takes less than 2 seconds.          Neurological:      General: No focal deficit present.      Mental Status: She is alert and oriented to person, place, and time.   Psychiatric:         Mood and Affect: Mood normal.         Results Reviewed       None            XR foot 3+ views RIGHT   ED Interpretation by Abiola Genao PA-C (12/30 2242)   No acute osseous abnormality.  Questionable fracture per Gleamer, however, patient is not tender and reports she did not injure her lateral foot.  Patient able to ambulate, gait intact.  Patient reports she is only tender over laceration.      Final Interpretation by Charlette Quesada MD (12/31 1034)      No acute osseous abnormality.   No lytic lesion or periosteal reaction   No radiopaque foreign body      Computerized Assisted Algorithm (CAA) may have been used to analyze all applicable images.         Workstation performed: [id number]             Universal Protocol:  Consent: Verbal consent obtained.  Risks and benefits: risks, benefits and alternatives were discussed  Consent given by: patient  Time out: Immediately prior to procedure a \"time out\" was called to verify the correct patient, procedure, equipment, support staff and site/side marked as required.  Timeout called at: 12/30/2024 10:12 PM.  Patient understanding: patient states understanding of the procedure being performed  Patient consent: the patient's understanding of the procedure matches consent given  Procedure consent: procedure consent matches procedure scheduled  Relevant documents: relevant documents present and verified  Test results: test results available and properly labeled  Site marked: the operative site was marked  Radiology Images displayed and confirmed. If images not available, report reviewed: imaging studies available  Required items: required blood products, implants, devices, and special equipment available  Patient identity confirmed: verbally with patient  Laceration " repair    Date/Time: 12/30/2024 10:12 PM    Performed by: Abiola Genao PA-C  Authorized by: Abiola Genao PA-C  Body area: lower extremity  Location details: right big toe  Laceration length: 2 cm    Anesthesia:  Local Anesthetic: lidocaine 2% without epinephrine      Procedure Details:  Preparation: Patient was prepped and draped in the usual sterile fashion.  Irrigation solution: saline  Irrigation method: syringe  Amount of cleaning: standard  Skin closure: 4-0 nylon  Number of sutures: 5  Technique: simple  Approximation: close  Approximation difficulty: simple  Dressing: 4x4 sterile gauze and antibiotic ointment  Patient tolerance: patient tolerated the procedure well with no immediate complications          ED Medication and Procedure Management   Prior to Admission Medications   Prescriptions Last Dose Informant Patient Reported? Taking?   fluticasone (FLOVENT HFA) 110 MCG/ACT inhaler   No No   Sig: Inhale 4 puffs 2 (two) times a day for 14 days Rinse mouth after use.   levothyroxine 150 mcg tablet   No No   Sig: TAKE ONE TABLET BY MOUTH EVERY DAY IN EARLY MORNING   methocarbamol (ROBAXIN) 500 mg tablet   No No   Sig: Take 1 tablet (500 mg total) by mouth 2 (two) times a day      Facility-Administered Medications Last Administration Doses Remaining   nystatin (MYCOSTATIN) powder None recorded         Discharge Medication List as of 12/30/2024 10:42 PM        CONTINUE these medications which have NOT CHANGED    Details   fluticasone (FLOVENT HFA) 110 MCG/ACT inhaler Inhale 4 puffs 2 (two) times a day for 14 days Rinse mouth after use., Starting Fri 1/13/2023, Until Fri 1/27/2023, Normal      levothyroxine 150 mcg tablet TAKE ONE TABLET BY MOUTH EVERY DAY IN EARLY MORNING, Normal      methocarbamol (ROBAXIN) 500 mg tablet Take 1 tablet (500 mg total) by mouth 2 (two) times a day, Starting Sat 8/13/2022, Normal           No discharge procedures on file.  ED SEPSIS DOCUMENTATION   Time reflects when  diagnosis was documented in both MDM as applicable and the Disposition within this note       Time User Action Codes Description Comment    12/30/2024 10:39 PM Abiola Genao [S91.319A] Foot laceration     12/30/2024 10:39 PM Abiola Genao [S99.929A] Foot injury                  Abiola Genao PA-C  01/04/25 0533

## 2025-01-13 ENCOUNTER — OFFICE VISIT (OUTPATIENT)
Dept: FAMILY MEDICINE CLINIC | Facility: CLINIC | Age: 30
End: 2025-01-13
Payer: COMMERCIAL

## 2025-01-13 VITALS
WEIGHT: 293 LBS | TEMPERATURE: 97.6 F | HEART RATE: 104 BPM | SYSTOLIC BLOOD PRESSURE: 134 MMHG | HEIGHT: 65 IN | BODY MASS INDEX: 48.82 KG/M2 | OXYGEN SATURATION: 94 % | DIASTOLIC BLOOD PRESSURE: 96 MMHG

## 2025-01-13 DIAGNOSIS — S91.311D LACERATION OF RIGHT FOOT, SUBSEQUENT ENCOUNTER: Primary | ICD-10-CM

## 2025-01-13 PROBLEM — Z00.00 ANNUAL PHYSICAL EXAM: Status: RESOLVED | Noted: 2023-02-07 | Resolved: 2025-01-13

## 2025-01-13 PROBLEM — E73.9 INTESTINAL DISACCHARIDASE DEFICIENCY AND DISACCHARIDE MALABSORPTION: Status: ACTIVE | Noted: 2021-05-27

## 2025-01-13 PROBLEM — Z86.19: Status: RESOLVED | Noted: 2020-08-04 | Resolved: 2025-01-13

## 2025-01-13 PROBLEM — R07.89 OTHER CHEST PAIN: Status: RESOLVED | Noted: 2020-08-04 | Resolved: 2025-01-13

## 2025-01-13 PROBLEM — Z23 ENCOUNTER FOR IMMUNIZATION: Status: RESOLVED | Noted: 2023-01-09 | Resolved: 2025-01-13

## 2025-01-13 PROCEDURE — S0630 REMOVAL OF SUTURES: HCPCS | Performed by: FAMILY MEDICINE

## 2025-01-13 NOTE — PROGRESS NOTES
"Name: Chrissy Mazariegos      : 1995      MRN: [id number]  Encounter Provider: Randee Reyes DO  Encounter Date: 2025   Encounter department: [facility]  :  Assessment & Plan  Laceration of right foot, subsequent encounter  Sutures removed as below without complication, encouraged continued local wound care  Orders:  •  Suture removal           History of Present Illness     Pt presents for suture removal  [facility] ED  due to foot lac  XR R Foot: No acute process; sequela of prior avulsion injury at base of 5th metatarsal  Given TDap   5 sutures placed      Review of Systems   Skin:  Positive for wound.       Objective   /96   Pulse 104   Temp 97.6 °F (36.4 °C)   Ht 5' 5\" (1.651 m)   Wt (!) 162 kg (358 lb)   SpO2 94%   BMI 59.57 kg/m²      Physical Exam  Vitals and nursing note reviewed.   Constitutional:       General: She is not in acute distress.     Appearance: Normal appearance.   Pulmonary:      Effort: Pulmonary effort is normal. No respiratory distress.   Musculoskeletal:        Feet:    Neurological:      Mental Status: She is alert.      Comments: Grossly intact   Psychiatric:         Mood and Affect: Mood normal.       Suture removal    Date/Time: 2025 4:00 PM    Performed by: Randee Reyes DO  Authorized by: Randee Reyes DO  Universal Protocol:  Consent: Verbal consent obtained.      Patient location:  Clinic  Location:     Laterality:  Right    Location:  Lower extremity    Lower extremity location:  Foot    Foot location:  R foot  Procedure details:     Tools used:  Scissors and tweezers    Wound appearance:  No sign(s) of infection and good wound healing    Sutures removed: 5.  Post-procedure details:     Post-removal:  Steri-Strips applied    Patient tolerance of procedure:  Tolerated well, no immediate complications          "

## 2025-01-16 ENCOUNTER — OFFICE VISIT (OUTPATIENT)
Age: 30
End: 2025-01-16

## 2025-01-16 ENCOUNTER — TELEPHONE (OUTPATIENT)
Age: 30
End: 2025-01-16

## 2025-01-16 VITALS
DIASTOLIC BLOOD PRESSURE: 76 MMHG | HEIGHT: 65 IN | WEIGHT: 293 LBS | BODY MASS INDEX: 48.82 KG/M2 | SYSTOLIC BLOOD PRESSURE: 126 MMHG

## 2025-01-16 DIAGNOSIS — F41.9 ANXIETY: ICD-10-CM

## 2025-01-16 DIAGNOSIS — N92.6 ABNORMAL BLEEDING IN MENSTRUAL CYCLE: ICD-10-CM

## 2025-01-16 DIAGNOSIS — E28.2 PCOS (POLYCYSTIC OVARIAN SYNDROME): Primary | ICD-10-CM

## 2025-01-16 DIAGNOSIS — Z30.09 STERILIZATION CONSULT: ICD-10-CM

## 2025-01-16 DIAGNOSIS — Z11.3 SCREENING FOR STDS (SEXUALLY TRANSMITTED DISEASES): ICD-10-CM

## 2025-01-16 DIAGNOSIS — Z12.4 SCREENING FOR CERVICAL CANCER: ICD-10-CM

## 2025-01-16 PROCEDURE — 87491 CHLMYD TRACH DNA AMP PROBE: CPT

## 2025-01-16 PROCEDURE — G0145 SCR C/V CYTO,THINLAYER,RESCR: HCPCS

## 2025-01-16 PROCEDURE — 87591 N.GONORRHOEAE DNA AMP PROB: CPT

## 2025-01-16 NOTE — ASSESSMENT & PLAN NOTE
Pt desires BC. I thoroughly r/w pt all available options for BC including OCPs, ring, Depo Provera, IUDs, Nexplanon and condoms. Aware of need for condoms for continued STD protection regardless of type of BC she chooses. Aware of risks and benefits and usage of each form of BC. Aware of start up, back up, etc. Patient is unsure which type of birth control she would like to start.  Patient provided with information pamphlets as well as additional information in her AVS. made patient aware that if she would like the shot or IUD she would need to make an appointment to have these done.    We discussed how PCOS can cause irregular bleeding patterns  We discussed how hypothyroidism can also cause irregular bleeding patterns  Pap smear collected  TSH already ordered by another provider, patient will have the blood drawn today.  CBC  Pelvic ultrasound ordered

## 2025-01-16 NOTE — ASSESSMENT & PLAN NOTE
Patient was diagnosed with PCOS when she was 16 via blood work and ultrasound, she has never used birth control before.   Discussed possible risk of endometrial hyperplasia and possible advancement to endometrial cancer associated with untreated chronic anovulation.   Advised patient that presentation of PCOS may vary between individuals, and that not all aspects are evident in all affected individuals.  Reviewed that 5-10% weight loss in PCOS patients may help to restore ovulatory cycles.   We discussed strategies for weight loss in light of the metabolic risks of hypertension, diabetes as well as hyperlipidemia and coronary artery disease in the long term.     Reviewed importance of endometrial protection. Treatment options including: hormonal contraception    All questions answered to patient's apparent satisfaction.

## 2025-01-16 NOTE — PROGRESS NOTES
Name: Chrissy Mazariegos      : 1995      MRN: [id number]  Encounter Provider: Adriane Khan PA-C  Encounter Date: 2025   Encounter department: [facility]ST[facility]OLOG[facility]  :  Assessment & Plan  PCOS (polycystic ovarian syndrome)    Patient was diagnosed with PCOS when she was 16 via blood work and ultrasound, she has never used birth control before.   Discussed possible risk of endometrial hyperplasia and possible advancement to endometrial cancer associated with untreated chronic anovulation.   Advised patient that presentation of PCOS may vary between individuals, and that not all aspects are evident in all affected individuals.  Reviewed that 5-10% weight loss in PCOS patients may help to restore ovulatory cycles.   We discussed strategies for weight loss in light of the metabolic risks of hypertension, diabetes as well as hyperlipidemia and coronary artery disease in the long term.     Reviewed importance of endometrial protection. Treatment options including: hormonal contraception    All questions answered to patient's apparent satisfaction.      Abnormal bleeding in menstrual cycle    Pt desires BC. I thoroughly r/w pt all available options for BC including OCPs, ring, Depo Provera, IUDs, Nexplanon and condoms. Aware of need for condoms for continued STD protection regardless of type of BC she chooses. Aware of risks and benefits and usage of each form of BC. Aware of start up, back up, etc. Patient is unsure which type of birth control she would like to start.  Patient provided with information pamphlets as well as additional information in her AVS. made patient aware that if she would like the shot or IUD she would need to make an appointment to have these done.    We discussed how PCOS can cause irregular bleeding patterns  We discussed how hypothyroidism can also cause irregular bleeding patterns  Pap smear collected  TSH already ordered by another provider,  patient will have the blood drawn today.  CBC  Pelvic ultrasound ordered    Anxiety  Patient interested in talk therapy to manage her anxiety  Orders:    Ambulatory referral to Psych Services; Future    Screening for STDs (sexually transmitted diseases)    Orders:    RPR-Syphilis Screening (Total Syphilis IGG/IGM); Future    Hepatitis C antibody; Future    Hepatitis B surface antigen; Future    HIV 1/2 AG/AB w Reflex [facility] for 2 yr old and above; Future    Chlamydia/GC amplified DNA by PCR    Screening for cervical cancer  Patient has not had a Pap since 2017 NILM  Pap was collected today due to her change in menses and being overdue  Patient advised to still schedule an annual exam this year for a comprehensive exam.  Orders:    Liquid-based pap, screening    Sterilization consult    Discussed with patient that she will need to make an appointment with a physician to discuss her options for sterilization        History of Present Illness   HPI  Chrissy Mazariegos is a 29 y.o. female with a past medical history of Hashimoto's thyroiditis, PCOS who presents with irregular menses.      Menarche ~[age]  In her late teens early 20s her periods were very unpredictable.  As she got into her mid to late 20s she said that her periods became more predictable and regular.  Then suddenly 6 months ago she got her period and has been bleeding since.    Patient has not started any new medications.  She is consistently taking her levothyroxine and has not had a recent dose change.  Patient has her TSH monitored.      Patient is sexually active with 1 male partner.  Patient states that she notices more bleeding occasionally after intercourse.  Patient states that they do not have intercourse frequently due to her partner being on an antidepressant leading to difficulties with intercourse.    Patient is not currently on any kind of birth control .  Patient states that when she was younger she tried fabio but it made her feel sick and  feel like she was going to pass out.      Patient does not have any contraindications to estrogen.    Patient states that she does not desire to have children and was wondering who she could talk to to discuss having her tubes tied.  Her partner went to a provider to try and get a vasectomy but they denied him due to being a young age.      Review of Systems   Constitutional:  Negative for chills, fatigue, fever and unexpected weight change.   Eyes:  Negative for visual disturbance.   Respiratory:  Negative for shortness of breath.    Cardiovascular:  Negative for chest pain and palpitations.   Gastrointestinal:  Negative for abdominal pain, anal bleeding, blood in stool, constipation, diarrhea, nausea and vomiting.   Endocrine: Negative for cold intolerance and heat intolerance.   Genitourinary:  Positive for menstrual problem. Negative for difficulty urinating, dyspareunia, dysuria, frequency, hematuria, pelvic pain, urgency, vaginal discharge and vaginal pain.   Musculoskeletal:  Negative for back pain.   Skin:  Negative for rash.   Neurological:  Negative for dizziness, syncope, light-headedness and headaches.   Psychiatric/Behavioral:  Negative for dysphoric mood. The patient is nervous/anxious (Patient is interested in talk therapy for management).      Current Outpatient Medications on File Prior to Visit   Medication Sig Dispense Refill    levothyroxine 150 mcg tablet TAKE ONE TABLET BY MOUTH EVERY DAY IN EARLY MORNING 30 tablet 4    fluticasone (FLOVENT HFA) 110 MCG/ACT inhaler Inhale 4 puffs 2 (two) times a day for 14 days Rinse mouth after use. 12 g 0    methocarbamol (ROBAXIN) 500 mg tablet Take 1 tablet (500 mg total) by mouth 2 (two) times a day (Patient not taking: Reported on 1/16/2025) 20 tablet 0     Current Facility-Administered Medications on File Prior to Visit   Medication Dose Route Frequency Provider Last Rate Last Admin    nystatin (MYCOSTATIN) powder   Topical BID Salazar Galvez,  "MD          Social History     Tobacco Use    Smoking status: Never    Smokeless tobacco: Never   Vaping Use    Vaping status: Never Used   Substance and Sexual Activity    Alcohol use: Yes     Comment: rarely    Drug use: Yes     Types: Marijuana    Sexual activity: Yes     Partners: Male     Birth control/protection: Condom        Objective   /76 (BP Location: Left arm, Patient Position: Sitting, Cuff Size: Large)   Ht 5' 5\" (1.651 m)   Wt (!) 164 kg (361 lb)   LMP 08/15/2024 (Approximate)   BMI 60.07 kg/m²      Physical Exam  Genitourinary:     General: Normal vulva.      Pubic Area: No rash.       Labia:         Right: No rash, tenderness or lesion.         Left: No rash, tenderness or lesion.       Vagina: No signs of injury. Vaginal discharge (menstruating) present. No erythema, tenderness or lesions.      Cervix: Discharge (menstruating) present. No cervical motion tenderness, friability, lesion or erythema.      Uterus: Not enlarged, not fixed, not tender and no uterine prolapse.       Adnexa:         Right: No mass, tenderness or fullness.          Left: No mass, tenderness or fullness.           Adriane Khan PA-C  "

## 2025-01-16 NOTE — PATIENT INSTRUCTIONS
.BIRTH CONTROL OPTIONS     There are a number of methods available to help prevent pregnancy.    Hormonal methods of birth control (contraception) contain either estrogen and progestin or progestin only; they are a safe and reliable way to prevent pregnancy for most people. Hormonal methods include an implant, an intrauterine device (IUD), injections, pills, vaginal rings, and skin patches.    This document discusses the various hormonal methods of birth control that are available.     CHOOSING A BIRTH CONTROL METHOD     It can be difficult to decide which birth control method is best due to the variety of options available. The best method is one that will be used consistently and does not cause bothersome side effects. Other factors to consider include:    ?Efficacy (how well it works to prevent pregnancy)  ?Convenience  ?How long the drug or device can be used  ?Whether and how it affects your monthly period  ?Type and frequency of side effects  ?Affordability  ?Privacy concerns  ?Whether or not it also protects against sexually transmitted diseases  ?How quickly your fertility will return if you stop taking it    You should also think about whether you are comfortable remembering to take a pill every day, whether you want to involve your partner(s) in the decision, and whether and when you might want to get pregnant in the future. No birth control is perfect; you must balance the advantages and disadvantages of the different options and decide which method is best for you.    BIRTH CONTROL IMPLANT     The implant (brand name: Nexplanon) is a small jerzy that contains the hormone progestin. It is inserted under the skin into the upper inner arm by a health care provider. It is effective for at least three years but can be removed earlier if you decide you want to get pregnant or simply prefer not to continue use of the implant. Insertion and removal can be done in an office or clinic.  The implant is one of the  most effective methods of birth control. It provides at least three years of protection from pregnancy as progestin is slowly absorbed into the surrounding tissues. Depending on when during the menstrual cycle the implant is placed, backup birth control (for instance condoms) may be recommended for one week following placement. Irregular bleeding is the most bothersome side effect. Fertility returns rapidly after the jerzy is removed.    Side effects -- The most common side effects of the implant are irregular/unpredictable bleeding.    IUD WITH PROGESTIN     There are several intrauterine devices (IUDs) that contain a hormone called levonorgestrel (a type of progestin). One type (brand names: Liletta, Mirena) can be left in place for up to six years. The other options (brand names: Kyleena, Tiffany) are somewhat smaller and can be left in place for up to five years (Kyleena) or three years (Tiffany). All of the levonorgestrel IUDs are highly effective in preventing pregnancy.    Side effects -- Although irregular bleeding is common initially after progestin IUD placement, bleeding tends to diminish over time. With ongoing use, people using Mirena or Liletta often experience little or no bleeding. Those who use Kyleena or Tiffany are more likely to continue having monthly periods.      INJECTABLE BIRTH CONTROL     The only injectable contraceptive currently available in the [place] is depot medroxyprogesterone acetate or DMPA (brand name: Depo-Provera). DMPA is injected deep into a muscle, such as in the buttock or upper arm, or injected subcutaneously (under the skin). With either type of injection, this contraceptive is given once every three months.    DMPA prevents ovulation and thickens the cervical mucus, making the cervix impenetrable to sperm. If you get your first dose of DMPA during the first seven days of your menstrual period, it prevents pregnancy immediately. If you get your first dose after the seventh  "day of your period, you should use a second form of birth control (eg, condoms) for seven days. DMPA is very effective, with a failure (pregnancy) rate of less than 1 percent when repeat injections are given on time.    Side effects -- The most common side effects of DMPA are irregular or prolonged bleeding and spotting, particularly during the first few months of use. Up to 50 percent of people completely stop having menstrual periods (doctors call this \"amenorrhea\") after one year of DMPA use. Monthly periods generally return within six months of the last DMPA injection, although, in some cases, it may take longer for periods to return. Some people gain weight while they are getting DMPA injections.    In people who get the DMPA shot, there is no increased risk of cardiovascular complications or cancer. Use of DMPA is associated with decreased bone mineral density; however, this effect is mostly or completely reversed after stopping the injections. Studies have not shown an increased risk of bone fractures in people who have used DMPA in the past.    Because DMPA is long-acting, it may not be ideal if you want to get pregnant shortly after stopping the medication. Although most people are able to conceive within 10 months, fertility may not return for up to 18 months after the last injection.    Benefits compared with birth control pills -- There are a number of people who prefer DMPA to the pill, including those who:    ?Have difficulty remembering to take a pill every day  ?Value the privacy with DMPA use  ?Cannot use estrogen  ?Also take seizure medications, which can be less effective with combination hormonal contraceptives.    Additional benefits of DMPA include a decreased risk of uterine cancer and pelvic inflammatory disease.    BIRTH CONTROL PILLS   Most oral contraceptives, commonly called \"the pill,\" contain a combination of estrogen and progestin. The combination pill reduces the risk of pregnancy " "by:    ?Preventing ovulation  ?Keeping the mucus in the cervix thick and impenetrable to sperm  ?Keeping the lining of the uterus thin    The pill makes menstrual bleeding more regular, with fewer days of flow and overall lighter flow. Other benefits of the pill include a reduction in:    ?Menstrual cramps or pain  ?Risk of ovarian cancer or cancer of the endometrium (uterine lining)  ?Acne  ?Iron-deficiency anemia (a low blood count due to low iron levels)    One potential downside of the pill is that, in order to maximize efficacy, you have to remember to take it every day, ideally at the same time of day. Some people find this difficult or inconvenient.    Efficacy -- When taken properly, birth control pills are a highly effective form of contraception; however, skipping pills or forgetting to restart the pill after the week of your period will increase risk of pregnancy. Approximately 9 out of every 100 people who take birth control pills for one year will have an unintended pregnancy.    Missed pills are a common cause of pregnancy. In general, if you forget to take an active pill (containing hormones), you should take it as soon as possible and take the next one at the usual time it is due. If you miss more than two pills, use a backup method of birth control (eg, condoms) for seven days.    Side effects -- Possible side effects of the pill include:    ?Nausea, breast tenderness, bloating, and mood changes - These typically improve within two to three months without treatment (while continuing the pill).    ?Irregular bleeding - Irregular bleeding, also called \"breakthrough bleeding\" or \"spotting,\" is particularly common during the first few months of taking the pill. It almost always resolves without any treatment within two to three months. Forgetting a pill can also cause breakthrough bleeding.    Taking birth control pills does not cause weight gain.    Potential complications -- When the pill was first " "introduced in the 1960s, the doses of both hormones (estrogen and progestin) were quite high. Because of this, cardiovascular complications occurred, such as high blood pressure, heart attacks, strokes, and blood clots in the legs and lungs.    The pills prescribed today have much lower doses of progestin and estrogen, which has decreased the risk of these complications. As a result, birth control pills are now considered a reliable and safe option for most healthy, nonsmoking people. While there is a very small risk of blood clots, this risk is actually lower than the risk during pregnancy or soon after giving birth.  Experts have studied the possible association between taking the pill and the risk of breast cancer. While these studies have had mixed results, there is some evidence that people who take the pill do have a slightly higher risk of getting breast cancer later in life than those who do not. However, if there is an increase in risk, it is very small, especially in younger people. It is important to balance this against the benefits of the pill, which include not only pregnancy prevention but a sizable reduction in the risk of ovarian and endometrial cancer.     Who should not take the pill? -- Because of an increased risk of complications, you should not take the pill if you:    ?Are 35 or older and smoke cigarettes (as this puts you at high risk for cardiovascular complications such as blood clots or heart attack).    ?Could be pregnant.    ?Have had blood clots or a stroke in the past (as this increases your risk of blood clots while taking the pill).    ?Have a history of an \"estrogen-dependent\" tumor (eg, breast or uterine cancer).    ?Have abnormal or unexplained menstrual bleeding (in which case the cause of the bleeding should be investigated before starting the pill).    ?Have active liver disease (the pill could worsen the liver disease).    ?Have migraine headaches associated with certain " visual or other neurologic symptoms (eg, aura), which increases your risk of stroke.    If you are taking the pill, tell your health care provider right away if you experience abdominal pain, chest pain, severe headaches, eye problems, or severe leg pain. These could be symptoms of several serious conditions including heart attack, blood clot, stroke, and liver or gallbladder disease.    Some people may take the pill under certain circumstances but need close monitoring. Talk with your doctor or nurse if you:    ?Have high blood pressure - You may experience a further increase in blood pressure and should be monitored more frequently while on the pill.    ?Take certain medication for seizures (epilepsy) - In this case, the pill may be slightly less effective in preventing pregnancy because the seizure medicines change the way it is metabolized.    ?Have diabetes mellitus - People with diabetes and kidney disease or vascular complications from diabetes should not use the pill.    Medication interactions -- The pill may not work as well to prevent pregnancy if you also take certain other medications.    Anticonvulsants -- Some anticonvulsants, including phenytoin (sample brand names: Dilantin, Phenytek), carbamazepine (sample brand names: Carbatrol, Tegretol), barbiturates, primidone (brand name: Mysoline), topiramate (sample brand name: Topamax), and oxcarbazepine (sample brand name: Trileptal), decrease the effectiveness of birth control pills as well as patches, vaginal rings, and the implant. As a result, people who take these anticonvulsants are advised to avoid hormonal birth control methods (with the exception of depot medroxyprogesterone acetate or DMPA [brand name: Depo-Provera] and intrauterine devices [IUDs] with progestin).     Other anticonvulsants do not appear to reduce contraceptive efficacy, including gabapentin (sample brand names: Gralise, Neurontin), lamotrigine (sample brand names: Lamictal,  "Subvenite), levetiracetam (sample brand names: Keppra, Roweepra), tiagabine (brand name: Gabitril), and valproic acid (brand name: Depakote). However, there is some concern that oral contraceptives may reduce the effectiveness of lamotrigine, potentially increasing the risk of seizures.    If you take any anti-seizure medications, it is important to talk with your health care provider about possible interactions before starting the pill or another hormonal birth control method.  Antibiotics -- Rifampin, which is sometimes used to treat tuberculosis, can decrease the efficacy of hormonal birth control. As a result, people who take rifampin are advised to avoid most hormonal birth control methods, with the exception of DMPA (brand name: Depo-Provera) and IUDs with progestin. Alternative options include a copper IUD, condoms, or a diaphragm, or tubal ligation (permanent birth control).     Contrary to popular belief, other (more commonly used) antibiotics do not affect the efficacy of hormonal birth control methods. Backup contraception is not needed when you take these antibiotics.    Eloina's Wort -- Eloina's wort, an herbal supplement sometimes taken to treat depression, may reduce the effectiveness of birth control pills, and possibly the patch and ring.    Starting the pill -- Ideally, you should start taking the pill on the first day of your period. This provides protection from pregnancy beginning immediately.    As long as you are sure you are not pregnant (which can be confirmed with a urine pregnancy test), it is also an option to start the pill as soon as your doctor prescribes it, regardless of where you are in your menstrual cycle. This is called the \"quick start\" method. If you do this, you will need to use a backup form of birth control (eg, condoms) for the first seven days after the quick start.    Many people start taking the pill on the first Sunday after their period starts (because most pill " "packs are arranged for a Sunday start). If you do this, you will also need to use some form of backup contraception (eg, condoms) for the first seven days after the Sunday start.    When to expect bleeding -- Traditionally, the pill is taken on a 28-day cycle that includes 21 days of hormone pills followed by 7 days of placebo pills (\"no hormone pills\") that do not contain hormones. Newer formulations have a longer duration of hormone pills (eg, 24 days) and fewer days of placebo pills (eg, 4 days). It is not necessary to take the placebo pills as they do not contain any active ingredients, but many people find it easier to stay on schedule when they continue to take a daily pill throughout the entire 28-day cycle.    Bleeding should occur during the fourth week of the pill pack (ie, the week that you are taking placebo pills or no pills). However, some people have irregular breakthrough bleeding or spotting in the first few months.     Continuous dosing -- Some people prefer to take hormone-containing birth control pills continuously, without the week of no pills or placebo pills. This allows you to control whether and when you have a monthly period. This regimen may be a good option if you have painful periods, endometriosis (a condition that causes pelvic pain), or bothersome premenstrual symptoms, including mood changes.    Traditional birth control pill packs can be dosed continuously to get rid of monthly bleeding. To do this, you take the first three weeks of a pill pack, then immediately start a new pack the next day (without taking a break or taking the placebo pills). This can be continued for as long as desired. A pill called Seasonale was specifically designed for continuous dosing. You take an active pill every day for 12 weeks, followed by seven days of placebo pills. With this regimen, you only experience bleeding once every three months. Seasonique it is similar; it contains 84 days of active pills " "and 7 days of low-dose estrogen pills. The addition of low-dose estrogen pills is intended to reduce breakthrough bleeding and possibly other symptoms, such as mood changes and headaches. Both are available as generic medications that work in the same way.    Over time, using continuous-dosing regimens results in fewer bleeding episodes per year (or no bleeding at all); however, many people experience unpredictable breakthrough bleeding when starting a continuous-dosing regimen. Breakthrough bleeding is inconvenient but does not mean that the pills are less effective (assuming you are taking them at the same time each day and not skipping any active pills).    Progestin-only pills -- Some pills contain only progestin (sometimes called the \"mini pill\"); these may be an option for people who cannot or should not take estrogen. This includes those who are breastfeeding or who have worsened migraines or high blood pressure with combination contraceptive pills. Progestin-only pills appear to be as effective as combination pills when taken at the same time every day, but they have a slightly higher failure rate if you are more than three hours late in taking them. A backup method of birth control should be used for seven days if you forget a pill or are more than three hours late in taking it. Breakthrough bleeding or spotting is common with both types of progestin-only pills.  Progestin-only pills are available in 28-day packs. Two types of progestin-only pills are available in the [place]. For one type (containing norethindrone), all 28 pills contain the hormone (ie, there is no \"placebo week\"). For the second type (containing drospirenone), each pack includes 24 hormone pills and 4 placebo pills. The drospirenone pill may be more effective than the norethindrone pill. A disadvantage of the drospirenone pill is that no generic is available, which may make it more expensive.    VAGINAL RINGS     These are flexible " plastic rings that contain estrogen and a progestin. The ring is inserted into the vagina, and the hormones are slowly absorbed into the body. This prevents pregnancy, similar to the pill. You keep the ring in your vagina for three weeks then leave it out for one week, during which you will experience bleeding. The following week, you insert a new ring or reinsert the previous one (one type of ring is reusable for approximately one year, while the other type needs to be discarded and replaced each month). As long as the ring remains in the vagina and is not uncomfortable, the ring's position inside the vagina is not important.    You can start using the vaginal ring anytime during your menstrual cycle. If you start it more than seven days after the first day of your last period, or if you are not sure when your last period started, you should use a backup method of contraception (eg, condoms) for the first seven days of inserting the ring.    Most people cannot feel the ring while it is in place, and in most cases, it is easy to insert and remove. It may be removed for a short time if desired, as discussed below, but should be left in during intercourse. Your partner most likely will not be able to feel the ring. You should use your fingers to check before and after sex to confirm the ring is in place. If the ring is left out for more than a few hours, you may be able to put it back in, or you may need to discard it, depending on which type of ring you use and where you are in your menstrual cycle. Because the instructions can vary, it is important to read the information that comes with your ring.    If you use the ring that gets replaced each month (brand names: NuvaRing, EluRyng):    ?During the first two weeks of your cycle, you can reinsert the ring and continue with the usual schedule. The ring should be rinsed in cool or warm (but not hot) water, without soap or detergent, before it is reinserted.    ?During  the third week, you can insert a new ring and begin a new cycle immediately, in which case you will not get a period. If the ring was previously in place for at least seven days in a row, you can also choose to leave the ring out for up to one week (during which you have your period) and then insert a new one.    ?Regardless of where you are in your cycle, if the ring is left out for more than three hours, you should use a backup method of birth control (eg, condoms) for the next seven days. Any backup method other than the female condom or diaphragm can be used.    If you use the reusable ring that gets reinserted each month (brand name: Annovera), the instructions are the same regardless of where you are in your menstrual cycle:    ?If the ring is out for two hours or less, you can reinsert it. Before doing so, wash the ring with mild soap, rinse with water, and gently dry.    ?If the ring is out for more than two hours, either continuously or cumulatively (eg, if you take it out two separate times that add up to more than two hours), you should also clean and insert the ring. However, you also must use a backup form of birth control (eg, condoms) for the next seven days.    As with the pill, in addition to being an effective method of preventing pregnancy, the vaginal ring also has other potential benefits. These include a reduction in menstrual cramps, iron-deficiency anemia, and the risk of certain cancers.    Risks and side effects are also similar to those of oral contraceptives.     BIRTH CONTROL SKIN PATCHES     Birth control skin patches (sample brand names: Xulane, Twirla) contain estrogen and progestin, similar to oral contraceptives. Both patches are similar to the pill in terms of efficacy in preventing pregnancy. Some people prefer patches because they do not require remembering to take a pill each day; on the other hand, some people do not like having a visible patch on their skin. Neither patch type  "should be used by people with a body mass index (BMI) of 30 kg/m2 or higher.    The patch is worn for one week on the shoulder, upper back, abdomen, or buttock; one type (brand name: Twirla) can also be worn on the upper arm. After one week, you remove the old patch and apply a new one; this is done for three weeks. During the fourth week, you do not apply a new patch; you will experience bleeding during this time.    The patch is ideally started on the first day of your period. This approach provides protection from pregnancy immediately. If you prefer, you can also start using the patch on the day it is prescribed, regardless of where you are in your menstrual cycle (called \"quick start\"). If you do this, you will need to use a backup form of birth control (eg, condoms) for the first seven days.  As with the pill, in addition to being an effective method of preventing pregnancy, the patch also likely has other potential benefits, although studies are limited. These include a reduction in menstrual cramps, iron-deficiency anemia, and the risk of certain cancers.     While efficacy is similar to the pill, the patch may deliver a higher overall dose of estrogen. Some studies found that this was associated with an approximate doubling of the risk of blood clots compared with use of oral contraceptives (the pill). However, other studies found no increase in risk compared with people using the pill. Further study is needed to define this risk.    PREGNANCY AFTER HORMONAL BIRTH CONTROL     The length of time it takes to become pregnant after use of a hormonal method of birth control depends upon which method was used, as well as some individual factors.  Most people return to their normal level of fertility within a cycle or two. For some, it may take several months before their cycle (including when they ovulate) becomes regular and they can get pregnant. This is more likely for people whose periods were irregular " before starting birth control. However, hormonal birth control does not increase the risk of infertility. In general:    ?People who use the pill, skin patch, or vaginal ring usually start ovulating regularly again within one to three months of stopping birth control.    ?With injectable depot medroxyprogesterone acetate (DMPA; brand name: Depo-Provera), return of fertility can be delayed. Approximately half of people who want to be pregnant are pregnant within 10 months of stopping DMPA. However, some people will not get their periods back for up to 18 months.    ?People who get an implant (eg, Nexplanon) or an intrauterine device (IUD) usually begin to ovulate again within one month after the device is removed.    EMERGENCY CONTRACEPTION     If you have unprotected sex or a problem with your birth control (for example, you miss a pill, your skin patch falls off, or your vaginal ring falls out), you can use emergency contraception to reduce your risk of pregnancy. There are two forms of emergency contraception, the copper intrauterine device (IUD) and pills. Emergency contraception should be taken as soon as possible after sex, ideally within 120 hours (five days). More information about this is available separately.     https://www.dianboom.com/contents/hormonal-methods-of-birth-control-beyond-the-basics

## 2025-01-16 NOTE — TELEPHONE ENCOUNTER
"Behavioral Health Integration Screening Questionnaire     Are you aware of the referred from your [facility] Provider  : Yes     Please advise interviewee that they need to answer all questions truthfully to allow for best care, and any misrepresentations of information may affect their ability to be seen at this clinic   => Was this discussed? Yes     If Minor Child (under age 18)    Who is/are the legal guardian(s) of the child?     Is there a custody agreement? No     If \"YES\"- Custody orders must be obtained prior to scheduling the first appointment  In addition, Consent to Treatment must be signed by all legal guardians prior to scheduling the first appointment    If \"NO\"- Consent to Treatment must be signed by all legal guardians prior to scheduling the first appointment    Behavioral Health Outpatient Intake History -     Presenting Problem (in patient's own words): anxiety, talk about coping mechanism    Are there any communication barriers for this patient?     No                                               If yes, please describe barriers:       Are you taking any psychiatric medications? No     If \"YES\" -What are they       If \"YES\" -Who prescribes?     Has the Patient abused alcohol or other substances in the last 6 months ? No  No concerns of substance abuse are reported.     If \"YES\" -What substance, How much, How often?     If illegal substance: Refer to [facility] (for GABRIELA) or [facility] Offices.   If Alcohol in excess of 10 drinks per week:  Refer to [facility] (for GABRIELA) or [facility] Offices    ACCEPTED as a patient Yes  If \"Yes\" Appointment Date: 1/27/2025 at 2:30 pm    Referred Elsewhere? No  If “Yes” - (Where? Ex: Therapy Anywhere; [facility];  [facility], etc.)       Name of Insurance Co: [employer]  Insurance ID# [id number]  Insurance Phone # [phone]  If ins is primary or secondary? Primary  If patient is a minor, parents information such as " Name, FRANCES of guarantor.

## 2025-01-16 NOTE — PROGRESS NOTES
Chrissy Mazariegos is a 29 y.o. female  Patient is here today for irregular vaginal bleeding .  Last menses : ^ months ago have been bleeding on and off for the last six months.  Patient has a history of PCOS.  Sexually active : Not currently   HPV Vaccine : Patient had 2 doses of HPV Vaccine   Any birth control use : NONE

## 2025-01-20 LAB
C TRACH DNA SPEC QL NAA+PROBE: NEGATIVE
N GONORRHOEA DNA SPEC QL NAA+PROBE: NEGATIVE

## 2025-01-21 ENCOUNTER — RESULTS FOLLOW-UP (OUTPATIENT)
Dept: OBGYN CLINIC | Facility: CLINIC | Age: 30
End: 2025-01-21

## 2025-01-24 LAB
LAB AP GYN PRIMARY INTERPRETATION: NORMAL
Lab: NORMAL

## 2025-01-27 ENCOUNTER — TELEPHONE (OUTPATIENT)
Dept: PSYCHIATRY | Facility: CLINIC | Age: 30
End: 2025-01-27

## 2025-01-27 NOTE — TELEPHONE ENCOUNTER
Writer reached out to R/S cancelled NP appointment sent via [employer]  Was unable to LVM mailbox full.

## 2025-01-28 ENCOUNTER — APPOINTMENT (OUTPATIENT)
Dept: LAB | Facility: HOSPITAL | Age: 30
End: 2025-01-28
Payer: COMMERCIAL

## 2025-01-28 DIAGNOSIS — E06.3 HYPOTHYROIDISM DUE TO HASHIMOTO'S THYROIDITIS: ICD-10-CM

## 2025-01-28 DIAGNOSIS — Z11.3 SCREENING FOR STDS (SEXUALLY TRANSMITTED DISEASES): ICD-10-CM

## 2025-01-28 DIAGNOSIS — N92.6 ABNORMAL BLEEDING IN MENSTRUAL CYCLE: ICD-10-CM

## 2025-01-28 LAB
BASOPHILS # BLD AUTO: 0.06 THOUSANDS/ΜL (ref 0–0.1)
BASOPHILS NFR BLD AUTO: 1 % (ref 0–1)
EOSINOPHIL # BLD AUTO: 0.3 THOUSAND/ΜL (ref 0–0.61)
EOSINOPHIL NFR BLD AUTO: 3 % (ref 0–6)
ERYTHROCYTE [DISTWIDTH] IN BLOOD BY AUTOMATED COUNT: 13.4 % (ref 11.6–15.1)
HBV SURFACE AG SER QL: NORMAL
HCT VFR BLD AUTO: 39.2 % (ref 34.8–46.1)
HCV AB SER QL: NORMAL
HGB BLD-MCNC: 12.3 G/DL (ref 11.5–15.4)
HIV 1+2 AB+HIV1 P24 AG SERPL QL IA: NORMAL
HIV 2 AB SERPL QL IA: NORMAL
HIV1 AB SERPL QL IA: NORMAL
HIV1 P24 AG SERPL QL IA: NORMAL
IMM GRANULOCYTES # BLD AUTO: 0.04 THOUSAND/UL (ref 0–0.2)
IMM GRANULOCYTES NFR BLD AUTO: 0 % (ref 0–2)
LYMPHOCYTES # BLD AUTO: 2.92 THOUSANDS/ΜL (ref 0.6–4.47)
LYMPHOCYTES NFR BLD AUTO: 30 % (ref 14–44)
MCH RBC QN AUTO: 27.2 PG (ref 26.8–34.3)
MCHC RBC AUTO-ENTMCNC: 31.4 G/DL (ref 31.4–37.4)
MCV RBC AUTO: 87 FL (ref 82–98)
MONOCYTES # BLD AUTO: 0.43 THOUSAND/ΜL (ref 0.17–1.22)
MONOCYTES NFR BLD AUTO: 4 % (ref 4–12)
NEUTROPHILS # BLD AUTO: 5.96 THOUSANDS/ΜL (ref 1.85–7.62)
NEUTS SEG NFR BLD AUTO: 62 % (ref 43–75)
NRBC BLD AUTO-RTO: 0 /100 WBCS
PLATELET # BLD AUTO: 328 THOUSANDS/UL (ref 149–390)
PMV BLD AUTO: 10.6 FL (ref 8.9–12.7)
RBC # BLD AUTO: 4.53 MILLION/UL (ref 3.81–5.12)
TREPONEMA PALLIDUM IGG+IGM AB [PRESENCE] IN SERUM OR PLASMA BY IMMUNOASSAY: NORMAL
TSH SERPL DL<=0.05 MIU/L-ACNC: 3.55 UIU/ML (ref 0.45–4.5)
WBC # BLD AUTO: 9.71 THOUSAND/UL (ref 4.31–10.16)

## 2025-01-28 PROCEDURE — 87389 HIV-1 AG W/HIV-1&-2 AB AG IA: CPT

## 2025-01-28 PROCEDURE — 87340 HEPATITIS B SURFACE AG IA: CPT

## 2025-01-28 PROCEDURE — 85025 COMPLETE CBC W/AUTO DIFF WBC: CPT

## 2025-01-28 PROCEDURE — 84443 ASSAY THYROID STIM HORMONE: CPT

## 2025-01-28 PROCEDURE — 36415 COLL VENOUS BLD VENIPUNCTURE: CPT

## 2025-01-28 PROCEDURE — 86803 HEPATITIS C AB TEST: CPT

## 2025-01-28 PROCEDURE — 86780 TREPONEMA PALLIDUM: CPT

## 2025-01-29 ENCOUNTER — RESULTS FOLLOW-UP (OUTPATIENT)
Dept: FAMILY MEDICINE CLINIC | Facility: CLINIC | Age: 30
End: 2025-01-29

## 2025-03-05 NOTE — PROGRESS NOTES
Name: Chrissy Mazariegos      : 1995      MRN: [id number]  Encounter Provider: Adriane Khan PA-C  Encounter Date: 3/6/2025   Encounter department: [facility] [place]  :  Assessment & Plan  Encounter for annual routine gynecological examination    Pap every 3 years if normal.  Exercise most days of week-minimum of 150-300 minutes per week.   Obtain appropriate diet and hydration.   Calcium 1000mg (in divided doses-max 600 mg at one time) + 600 vit D daily.  Condom use when sexually active for sexually transmitted infection prevention.   monthly breast self exam recommended.   Kegels 20 times twice daily.   Call your insurance company to verify coverage prior to completing any ordered tests.   Return to office in one year or sooner, if needed.       Mass of upper inner quadrant of right breast  Small mobile, round, non-tender mass noted at 1:00  Breast U/S ordered  Orders:    US breast right limited (diagnostic); Future    Menometrorrhagia    We re-reviewed birth control options at the visit today  Patient does not have any contraindications for birth control  We discussed TXA  Patient does not desire any medication management at this time  Patient has a ultrasound already ordered from our last visit, patient is working on getting an appointment scheduled  Additional education provided in the AVS    Generalized headaches     Tylenol or ibuprofen PRN  Good hydration  Consider looking for risk of mold or other environmental exposures in her new apartment that may be triggering  Stress management techniques        History of Present Illness   HPI  Chrissy Mazariegos is a 29 y.o. female who presents for an annual exam.    LMP persistent bleeding that will range from light to heavier bleeding consistent with a normal period, menses will occasionally stop for a couple of days and resume again. Notes small clots recently  Sexually active Yes Monogamous   Birth control No, not  interested at this time  Desiring pregnancy in the next year No  History of abnormal pap: No  Last pap 1/16/25 , Findings NILM/HPV neg , Next pap: 2028  Self breast exam Yes  HPV vaccine series completed: Yes    Has pelvic ultrasound already ordered from previous visit  No contraindications to estrogen containing birth control. Patient does not desire birth control at this time.    Moved into a new apartment at the start of February. When she moved into her apartment she noticed frequent headaches. She thinks it may be due to stress    Hereditary Cancer Screening  Cancer-related family history includes Breast cancer in her maternal grandmother; Liver cancer in her maternal grandmother. There is no history of Colon cancer or Ovarian cancer.    Review of Systems   Constitutional:  Negative for chills, fatigue, fever and unexpected weight change.   Eyes:  Negative for visual disturbance.   Respiratory:  Negative for shortness of breath.    Cardiovascular:  Negative for chest pain and palpitations.   Gastrointestinal:  Negative for abdominal pain, anal bleeding, blood in stool, constipation, diarrhea, nausea and vomiting.   Endocrine: Negative for cold intolerance and heat intolerance.   Genitourinary:  Positive for menstrual problem. Negative for difficulty urinating, dyspareunia, dysuria, frequency, hematuria, pelvic pain, urgency, vaginal bleeding, vaginal discharge and vaginal pain.   Musculoskeletal:  Negative for back pain.   Skin:  Negative for rash.   Neurological:  Positive for headaches. Negative for dizziness, syncope and light-headedness.   Psychiatric/Behavioral:  Negative for dysphoric mood. The patient is not nervous/anxious.      Current Outpatient Medications on File Prior to Visit   Medication Sig Dispense Refill    levothyroxine 150 mcg tablet TAKE ONE TABLET BY MOUTH EVERY DAY IN EARLY MORNING 30 tablet 4    fluticasone (FLOVENT HFA) 110 MCG/ACT inhaler Inhale 4 puffs 2 (two) times a day for 14 days  "Rinse mouth after use. 12 g 0    methocarbamol (ROBAXIN) 500 mg tablet Take 1 tablet (500 mg total) by mouth 2 (two) times a day (Patient not taking: Reported on 1/16/2025) 20 tablet 0     Current Facility-Administered Medications on File Prior to Visit   Medication Dose Route Frequency Provider Last Rate Last Admin    nystatin (MYCOSTATIN) powder   Topical BID Salazar Galvez MD          Social History     Tobacco Use    Smoking status: Never    Smokeless tobacco: Never   Vaping Use    Vaping status: Never Used   Substance and Sexual Activity    Alcohol use: Yes     Comment: rarely    Drug use: Not Currently     Types: Marijuana     Comment: history    Sexual activity: Yes     Partners: Male     Birth control/protection: Condom Male        Objective   /86 (BP Location: Left arm, Patient Position: Sitting, Cuff Size: Large)   Ht 5' 6\" (1.676 m)   Wt (!) 161 kg (354 lb)   BMI 57.14 kg/m²      Physical Exam  Vitals and nursing note reviewed.   Constitutional:       General: She is not in acute distress.     Appearance: She is well-developed.   HENT:      Head: Normocephalic and atraumatic.   Eyes:      Extraocular Movements: Extraocular movements intact.   Pulmonary:      Effort: Pulmonary effort is normal.   Chest:   Breasts:     Right: Mass present. No swelling, bleeding, inverted nipple, nipple discharge, skin change or tenderness.      Left: No swelling, bleeding, inverted nipple, mass, nipple discharge, skin change or tenderness.          Comments: Bilateral nipple piercing  Abdominal:      Palpations: Abdomen is soft.      Tenderness: There is no abdominal tenderness.      Hernia: There is no hernia in the left inguinal area or right inguinal area.   Genitourinary:     General: Normal vulva.      Exam position: Lithotomy position.      Pubic Area: No rash.       Labia:         Right: No rash, tenderness or lesion.         Left: No rash, tenderness or lesion.       Urethra: No urethral pain or " urethral swelling.      Vagina: Vaginal discharge (menstrual blood) present. No erythema, tenderness, bleeding or lesions.      Cervix: Discharge (menstrual blood) present. No cervical motion tenderness, friability, lesion, erythema or cervical bleeding.      Uterus: Not enlarged and not tender.       Adnexa:         Right: No mass, tenderness or fullness.          Left: No mass, tenderness or fullness.     Lymphadenopathy:      Upper Body:      Right upper body: No supraclavicular, axillary or pectoral adenopathy.      Left upper body: No supraclavicular, axillary or pectoral adenopathy.      Lower Body: No right inguinal adenopathy. No left inguinal adenopathy.   Skin:     General: Skin is warm and dry.   Neurological:      Mental Status: She is alert.   Psychiatric:         Mood and Affect: Mood normal.         Behavior: Behavior normal.       Adriane Khan PA-C

## 2025-03-06 ENCOUNTER — ANNUAL EXAM (OUTPATIENT)
Age: 30
End: 2025-03-06
Payer: COMMERCIAL

## 2025-03-06 VITALS
DIASTOLIC BLOOD PRESSURE: 86 MMHG | HEIGHT: 66 IN | SYSTOLIC BLOOD PRESSURE: 122 MMHG | WEIGHT: 293 LBS | BODY MASS INDEX: 47.09 KG/M2

## 2025-03-06 DIAGNOSIS — N92.1 MENOMETRORRHAGIA: ICD-10-CM

## 2025-03-06 DIAGNOSIS — N63.12 MASS OF UPPER INNER QUADRANT OF RIGHT BREAST: ICD-10-CM

## 2025-03-06 DIAGNOSIS — R51.9 GENERALIZED HEADACHES: ICD-10-CM

## 2025-03-06 DIAGNOSIS — Z01.419 ENCOUNTER FOR ANNUAL ROUTINE GYNECOLOGICAL EXAMINATION: Primary | ICD-10-CM

## 2025-03-06 PROCEDURE — S0612 ANNUAL GYNECOLOGICAL EXAMINA: HCPCS

## 2025-03-28 ENCOUNTER — OFFICE VISIT (OUTPATIENT)
Age: 30
End: 2025-03-28
Payer: COMMERCIAL

## 2025-03-28 VITALS — SYSTOLIC BLOOD PRESSURE: 132 MMHG | WEIGHT: 293 LBS | BODY MASS INDEX: 57.17 KG/M2 | DIASTOLIC BLOOD PRESSURE: 86 MMHG

## 2025-03-28 DIAGNOSIS — Z30.09 BIRTH CONTROL COUNSELING: ICD-10-CM

## 2025-03-28 DIAGNOSIS — E28.2 PCOS (POLYCYSTIC OVARIAN SYNDROME): Primary | ICD-10-CM

## 2025-03-28 PROCEDURE — 99214 OFFICE O/P EST MOD 30 MIN: CPT | Performed by: OBSTETRICS & GYNECOLOGY

## 2025-03-28 NOTE — ASSESSMENT & PLAN NOTE
We reviewed her episode of prolonged bleeding and hx along with sx of PCOS. Recommend annual lipid and A1C. Ordered.    Reviewed typical treatment includes COCP and effect of SHBG. She is not bothered by hirstuism and declines OCP due to systemic SE.   We discussed increased risk of hyperplasia and endometrial cancer in prolonged periods of anovulation. Given recent 4 month bleeding episode, encourage her to obtain US and consider EMB depending on results. Discussed option for IUD for endometrial protection and also for contraception.  We reviewed IUD in depth as an option for endometrial protection and contraception while she works on weight loss.  She is currently not pursuing this but is interested in weight management.  A referral was placed today.     Orders:  •  Testosterone, free, total; Future  •  Lipid panel; Future  •  Hemoglobin A1C; Future  •  Ambulatory Referral to Weight Management; Future

## 2025-03-28 NOTE — PROGRESS NOTES
Name: Chrissy Mazariegos      : 1995      MRN: [id number]  Encounter Provider: Melina Salazar MD  Encounter Date: 3/28/2025   Encounter department: [facility] [place]  :  Assessment & Plan  PCOS (polycystic ovarian syndrome)  We reviewed her episode of prolonged bleeding and hx along with sx of PCOS. Recommend annual lipid and A1C. Ordered.    Reviewed typical treatment includes COCP and effect of SHBG. She is not bothered by hirstuism and declines OCP due to systemic SE.   We discussed increased risk of hyperplasia and endometrial cancer in prolonged periods of anovulation. Given recent 4 month bleeding episode, encourage her to obtain US and consider EMB depending on results. Discussed option for IUD for endometrial protection and also for contraception.  We reviewed IUD in depth as an option for endometrial protection and contraception while she works on weight loss.  She is currently not pursuing this but is interested in weight management.  A referral was placed today.     Orders:  •  Testosterone, free, total; Future  •  Lipid panel; Future  •  Hemoglobin A1C; Future  •  Ambulatory Referral to Weight Management; Future    Birth control counseling  We reviewed option of salpingectomy. However, BMI 57 with mostly central adiposity. Reviewed difficulty with ventilation and risks associated with surgery for an elective procedure.  Reviewed alternatives.  I also clarified that her partner is not too young for vasectomy and that he can call a different urologist and if he had trouble with a urologist at [facility] that we can reach out to them.         History of Present Illness   HPI  Chrissy Mazariegos is a 29 y.o. female who presents for sterilization consult    She has hx of PCOS and hypothyroidism.   She is currently having an episode of prolonged bleeding a few months ago (3-4 months ago). She did not try anything to stop bleeding yet. Prior to that, she  usually had monthly menses.   Sx: hirstusim (waxes chin), US with polycystic ovaries a long time  Menarche age 18 and irregular  Saw endo age 16    Has not had lipids or A1C checked since 2021  TSH 3.55 on medication    Does not desire fertility. Uses condoms currently.   Ramu age 30; saw urology who told him he is too young for a vasectomy    GM with breast cancer age 60s        Review of Systems       Objective   /86 (BP Location: Left arm, Patient Position: Sitting, Cuff Size: Large)   Wt (!) 161 kg (354 lb 3.2 oz)   LMP  (LMP Unknown)   BMI 57.17 kg/m²      Physical Exam  Vitals and nursing note reviewed.   Constitutional:       General: She is not in acute distress.  Pulmonary:      Effort: Pulmonary effort is normal. No respiratory distress.   Skin:     General: Skin is warm and dry.   Neurological:      Mental Status: She is alert. Mental status is at baseline.

## 2025-04-08 ENCOUNTER — APPOINTMENT (OUTPATIENT)
Age: 30
End: 2025-04-08
Payer: COMMERCIAL

## 2025-04-08 DIAGNOSIS — E28.2 PCOS (POLYCYSTIC OVARIAN SYNDROME): ICD-10-CM

## 2025-04-08 LAB
CHOLEST SERPL-MCNC: 181 MG/DL (ref ?–200)
EST. AVERAGE GLUCOSE BLD GHB EST-MCNC: 123 MG/DL
HBA1C MFR BLD: 5.9 %
HDLC SERPL-MCNC: 47 MG/DL
LDLC SERPL CALC-MCNC: 106 MG/DL (ref 0–100)
NONHDLC SERPL-MCNC: 134 MG/DL
TRIGL SERPL-MCNC: 140 MG/DL (ref ?–150)

## 2025-04-08 PROCEDURE — 36415 COLL VENOUS BLD VENIPUNCTURE: CPT

## 2025-04-08 PROCEDURE — 83036 HEMOGLOBIN GLYCOSYLATED A1C: CPT

## 2025-04-08 PROCEDURE — 84403 ASSAY OF TOTAL TESTOSTERONE: CPT

## 2025-04-08 PROCEDURE — 84402 ASSAY OF FREE TESTOSTERONE: CPT

## 2025-04-08 PROCEDURE — 80061 LIPID PANEL: CPT

## 2025-04-09 ENCOUNTER — RESULTS FOLLOW-UP (OUTPATIENT)
Dept: LABOR AND DELIVERY | Facility: HOSPITAL | Age: 30
End: 2025-04-09

## 2025-04-10 LAB
TESTOST FREE SERPL-MCNC: 2.5 PG/ML (ref 0–4.2)
TESTOST SERPL-MCNC: 39 NG/DL (ref 13–71)

## 2025-04-22 ENCOUNTER — OFFICE VISIT (OUTPATIENT)
Dept: BARIATRICS | Facility: CLINIC | Age: 30
End: 2025-04-22

## 2025-04-22 VITALS
WEIGHT: 293 LBS | HEIGHT: 65 IN | HEART RATE: 84 BPM | DIASTOLIC BLOOD PRESSURE: 98 MMHG | BODY MASS INDEX: 48.82 KG/M2 | OXYGEN SATURATION: 96 % | SYSTOLIC BLOOD PRESSURE: 138 MMHG

## 2025-04-22 DIAGNOSIS — R73.03 PREDIABETES: ICD-10-CM

## 2025-04-22 DIAGNOSIS — E06.3 HYPOTHYROIDISM DUE TO HASHIMOTO'S THYROIDITIS: ICD-10-CM

## 2025-04-22 DIAGNOSIS — E28.2 PCOS (POLYCYSTIC OVARIAN SYNDROME): ICD-10-CM

## 2025-04-22 DIAGNOSIS — E66.01 MORBID OBESITY WITH BMI OF 50.0-59.9, ADULT (HCC): Primary | ICD-10-CM

## 2025-04-22 RX ORDER — TIRZEPATIDE 2.5 MG/.5ML
2.5 INJECTION, SOLUTION SUBCUTANEOUS WEEKLY
Qty: 2 ML | Refills: 0 | Status: SHIPPED | OUTPATIENT
Start: 2025-04-22 | End: 2025-05-20

## 2025-04-22 NOTE — ASSESSMENT & PLAN NOTE
- Discussed options of HealthyCORE-Intensive Lifestyle Intervention Program, Very Low Calorie Diet-VLCD, and Conservative Program and the role of weight loss medications.  - Patient is interested in pursuing Conservative Program and follow up visits with medical weight management provider.  - Explained the importance of making lifestyle changes in addition to starting any anti-obesity medications.   - Initial weight loss goal of 5-10% weight loss for improved health. Weight loss can improve patient's co-morbid conditions and/or prevent weight-related complications.  - Weight is not at goal and patient has been unable to achieve a meaningful weight loss above 5% using various programs and tools for more than 6 months  - Labs reviewed.     General Recommendations:  Nutrition:  Eat breakfast daily.  Do not skip meals.      Food log (ie.) www.[employer].com, sparkpeople.com, loseit.com, calorieking.com, etc.     Practice mindful eating.  Be sure to set aside time to eat, eat slowly, and savor your food.     Hydration:    At least 64oz of water daily.  No sugar sweetened beverages.  No juice (eat the fruit instead).     Exercise:  Studies have shown that the ideal exercise goal is somewhere between 150 to 300 minutes of moderate intensity exercise a week.  Start with exercising 10 minutes every other day and gradually increase physical activity with a goal of at least 150 minutes of moderate intensity exercise a week, divided over at least 3 days a week.  An example of this would be exercising 30 minutes a day, 5 days a week.  Resistance training can increase muscle mass and increase our resting metabolic rate.   FULL BODY resistance training is recommended 2-3 times a week.  Do not do this on consecutive days to allow for muscle recovery.     Aim for a bare minimum 5000 steps, even on days you do not exercise.     Monitoring:   Weigh yourself daily.  If this causes undue stress, then just weigh yourself once a week.   Weigh yourself the same time of the day with the same amount of clothing on.  Preferably this should be done after waking up, before you eat, and with no clothing or minimal clothing on.     Specific Goals:  Calorie goal:  1500 veronica/day (Provided with meal plan to follow)    Zepbound Instructions:    - Begin Zepbound 2.5 mg subcutaneously once a week. Dose changes may occur after 4 doses if medication is tolerated. You will be assessed prior to each dose change to make sure you are tolerating the medication well.  - Please message me when you have 2 pens left from the prescription so there are no lapses in treatment.  - If you have been off the medication for more than 14 days please contact the office as you will need to restart the titration at the starting dose again to avoid significant side effects or adverse events.  - Visit Zepbound.com for further information/injection instructions.   -Please eat small frequent meals to help reduce nausea. Lemon water and saltine crackers may help with this.   - Side effects of Zepbound discussed: nausea, vomiting, diarrhea, and constipation. If you experience fever, nausea/vomiting, and pain radiating to your back this may be a sign of pancreatitis. Please go to the emergency room if this occurs.  - If on oral birth control a 2nd method of birth control is recommended during the 1st 8 weeks of therapy and for 4 weeks after any dosage change.   - Patient understands the side effects of the medication and proper administration. Patient agrees with the treatment plan and all questions were answered.    - Side effects of Zepbound: nausea, vomiting, diarrhea, and constipation. If severe abdominal pain develops, stop Zepbound and go to the ER, as this could be pancreatitis. Monitor heart rate while on Zepbound and if resting heart rate greater than 100 beats per minute, patient will notify me.   - If you need to have surgery or another procedure, such as an upper endoscopy or  colonoscopy, please contact my office as often medications like Zepbound need to be held for a certain amount of time prior to a procedure.       GLP-1 Managing Side Effects Tips:  - focus on small frequent meals  - moderate sugar and fat intake  - change the injection site (abdomen --> thigh--> back of arm)  - eat protein, crackers, mints and haylee-based drinks  - nighttime injections  - drink plenty of water  - consider fiber supplements like miralax     Tips for Nausea:  - Don't drink and eat simultaneously: separate fluids 30-60 minutes before and after meals when experiencing nausea or if you notice you become full quickly  - Choose mild smelling foods- strong smells can exacerbate nausea  - Haylee and peppermint- consider drinking haylee or peppermint tea, which can help alleviate symptoms  - Eat- don't skip meals, if you have nausea, it is understandable that you may not feel like eating. However, skipping meals is generally not recommended as this can lead to lower blood sugar and fatigue. Furthermore, it is essential to consume adequate protein during weight loss. You can opt for a protein shake, a meal replacement supplement, bone broth or soup.      Tips for Constipation:   - Drink plenty of water  - Eat food with a high fiber content: increase your fiber intake by consuming these types of foods:              Eat more whole grain products like barley, oats, farro, brown or wild rice and quinoa.               Look for choices with 100% whole wheat, rye, oats or bran as the first ingredient listed               Check nutrition fact labels and choose products with 4gm of dietary fiber or more per serving              Add more beans to your diet              Eat more fresh fruits and vegetables with skins on them               Exercise- increase physical activity as it helps stimulate gastric mobility, which moves stool through the digestive tract     Patient denies personal history of pancreatitis. Patient  also denies personal and family history of medullary thyroid cancer and multiple endocrine neoplasia type 2 (MEN 2 tumor). Increased risk for the aforementioned disease processes as well as those included in the medication monograph, associated with taking GLP-1 medication discussed. Patient acknowledges and would like to proceed with treatment. Patient also denies pregnancy. Expresses understanding that medication is not safe in pregnancy and will discontinue in the event of pregnancy. Patient demonstrates full understanding verbally. All questions answered.       Patient understands the side effects of the medication and proper administration. Patient agrees with the treatment plan and all questions were answered.    Patient understands that it can take between 7-21 BUSINESS DAYS for a prior authorization to be initiated by the office and an additional 7-21 BUSINESS DAYS for the insurance company to provide a decision. If the medication is denied by an insurance plan exclusion, the office WILL NOT appeal the medication.

## 2025-04-22 NOTE — PROGRESS NOTES
Assessment/Plan:    Morbid obesity with BMI of 50.0-59.9, adult (HCC)  - Discussed options of Healthy[facility]-Intensive Lifestyle Intervention Program, Very Low Calorie Diet-VLCD, and Conservative Program and the role of weight loss medications.  - Patient is interested in pursuing Conservative Program and follow up visits with medical weight management provider.  - Explained the importance of making lifestyle changes in addition to starting any anti-obesity medications.   - Initial weight loss goal of 5-10% weight loss for improved health. Weight loss can improve patient's co-morbid conditions and/or prevent weight-related complications.  - Weight is not at goal and patient has been unable to achieve a meaningful weight loss above 5% using various programs and tools for more than 6 months  - Labs reviewed.     General Recommendations:  Nutrition:  Eat breakfast daily.  Do not skip meals.      Food log (ie.) [email or web address], sparkpeople.com, loseit.com, WinWeb.com, etc.     Practice mindful eating.  Be sure to set aside time to eat, eat slowly, and savor your food.     Hydration:    At least 64oz of water daily.  No sugar sweetened beverages.  No juice (eat the fruit instead).     Exercise:  Studies have shown that the ideal exercise goal is somewhere between 150 to 300 minutes of moderate intensity exercise a week.  Start with exercising 10 minutes every other day and gradually increase physical activity with a goal of at least 150 minutes of moderate intensity exercise a week, divided over at least 3 days a week.  An example of this would be exercising 30 minutes a day, 5 days a week.  Resistance training can increase muscle mass and increase our resting metabolic rate.   FULL BODY resistance training is recommended 2-3 times a week.  Do not do this on consecutive days to allow for muscle recovery.     Aim for a bare minimum 5000 steps, even on days you do not exercise.     Monitoring:   Weigh yourself  daily.  If this causes undue stress, then just weigh yourself once a week.  Weigh yourself the same time of the day with the same amount of clothing on.  Preferably this should be done after waking up, before you eat, and with no clothing or minimal clothing on.     Specific Goals:  Calorie goal:  1500 veronica/day (Provided with meal plan to follow)    Zepbound Instructions:    - Begin Zepbound 2.5 mg subcutaneously once a week. Dose changes may occur after 4 doses if medication is tolerated. You will be assessed prior to each dose change to make sure you are tolerating the medication well.  - Please message me when you have 2 pens left from the prescription so there are no lapses in treatment.  - If you have been off the medication for more than 14 days please contact the office as you will need to restart the titration at the starting dose again to avoid significant side effects or adverse events.  - Visit [email or web address].com for further information/injection instructions.   -Please eat small frequent meals to help reduce nausea. Lemon water and saltine crackers may help with this.   - Side effects of Zepbound discussed: nausea, vomiting, diarrhea, and constipation. If you experience fever, nausea/vomiting, and pain radiating to your back this may be a sign of pancreatitis. Please go to the emergency room if this occurs.  - If on oral birth control a 2nd method of birth control is recommended during the 1st 8 weeks of therapy and for 4 weeks after any dosage change.   - Patient understands the side effects of the medication and proper administration. Patient agrees with the treatment plan and all questions were answered.    - Side effects of Zepbound: nausea, vomiting, diarrhea, and constipation. If severe abdominal pain develops, stop Zepbound and go to the ER, as this could be pancreatitis. Monitor heart rate while on Zepbound and if resting heart rate greater than 100 beats per minute, patient will notify me.   - If you  need to have surgery or another procedure, such as an upper endoscopy or colonoscopy, please contact my office as often medications like Zepbound need to be held for a certain amount of time prior to a procedure.       GLP-1 Managing Side Effects Tips:  - focus on small frequent meals  - moderate sugar and fat intake  - change the injection site (abdomen --> thigh--> back of arm)  - eat protein, crackers, mints and haylee-based drinks  - nighttime injections  - drink plenty of water  - consider fiber supplements like miralax     Tips for Nausea:  - Don't drink and eat simultaneously: separate fluids 30-60 minutes before and after meals when experiencing nausea or if you notice you become full quickly  - Choose mild smelling foods- strong smells can exacerbate nausea  - Haylee and peppermint- consider drinking haylee or peppermint tea, which can help alleviate symptoms  - Eat- don't skip meals, if you have nausea, it is understandable that you may not feel like eating. However, skipping meals is generally not recommended as this can lead to lower blood sugar and fatigue. Furthermore, it is essential to consume adequate protein during weight loss. You can opt for a protein shake, a meal replacement supplement, bone broth or soup.      Tips for Constipation:   - Drink plenty of water  - Eat food with a high fiber content: increase your fiber intake by consuming these types of foods:              Eat more whole grain products like barley, oats, farro, brown or wild rice and quinoa.               Look for choices with 100% whole wheat, rye, oats or bran as the first ingredient listed               Check nutrition fact labels and choose products with 4gm of dietary fiber or more per serving              Add more beans to your diet              Eat more fresh fruits and vegetables with skins on them               Exercise- increase physical activity as it helps stimulate gastric mobility, which moves stool through the  digestive tract     Patient denies personal history of pancreatitis. Patient also denies personal and family history of medullary thyroid cancer and multiple endocrine neoplasia type 2 (MEN 2 tumor). Increased risk for the aforementioned disease processes as well as those included in the medication monograph, associated with taking GLP-1 medication discussed. Patient acknowledges and would like to proceed with treatment. Patient also denies pregnancy. Expresses understanding that medication is not safe in pregnancy and will discontinue in the event of pregnancy. Patient demonstrates full understanding verbally. All questions answered.       Patient understands the side effects of the medication and proper administration. Patient agrees with the treatment plan and all questions were answered.    Patient understands that it can take between 7-21 BUSINESS DAYS for a prior authorization to be initiated by the office and an additional 7-21 BUSINESS DAYS for the insurance company to provide a decision. If the medication is denied by an insurance plan exclusion, the office WILL NOT appeal the medication.             Chrissy was seen today for follow-up and consult.    Diagnoses and all orders for this visit:    Morbid obesity with BMI of 50.0-59.9, adult (HCC)  -     tirzepatide (Zepbound) 2.5 mg/0.5 mL auto-injector; Inject 0.5 mL (2.5 mg total) under the skin once a week for 28 days    PCOS (polycystic ovarian syndrome)  -     Ambulatory Referral to Weight Management  -     tirzepatide (Zepbound) 2.5 mg/0.5 mL auto-injector; Inject 0.5 mL (2.5 mg total) under the skin once a week for 28 days    Hypothyroidism due to Hashimoto's thyroiditis  -     tirzepatide (Zepbound) 2.5 mg/0.5 mL auto-injector; Inject 0.5 mL (2.5 mg total) under the skin once a week for 28 days    Prediabetes           Total time spent reviewing chart, interviewing patient, examining patient, discussing plan, answering all questions, and documenting:  35 min, with >50% face-to-face time spent counseling patient on nonsurgical interventions for the treatment of excess weight. Discussed in detail nonsurgical options including intensive lifestyle intervention program, very low-calorie diet program and conservative program.  Discussed the role of weight loss medications.  Counseled patient on diet behavior and exercise modification for weight loss.    Follow up in approximately 3 months with Non-Surgical Physician/Advanced Practitioner.    Subjective:   Chief Complaint   Patient presents with    Follow-up    Consult     Neck 16.5  Waist 58  SB 3/8       Patient ID: Chrissy Mazariegos  is a 29 y.o. female with excess weight/obesity here to pursue weight management.  Previous notes and records have been reviewed.    Past Medical History:   Diagnosis Date    Anemia     Anxiety     Disease of thyroid gland     hypothyroid    Hashimoto's disease     Morbid obesity with BMI of 50.0-59.9, adult (HCC)     PCOS (polycystic ovarian syndrome)     Thyroid disease     Tinea corporis 06/21/2016     Past Surgical History:   Procedure Laterality Date    PILONIDAL CYST EXCISION         HPI:  Wt Readings from Last 20 Encounters:   04/22/25 (!) 160 kg (352 lb)   03/28/25 (!) 161 kg (354 lb 3.2 oz)   03/06/25 (!) 161 kg (354 lb)   01/16/25 (!) 164 kg (361 lb)   01/13/25 (!) 162 kg (358 lb)   08/13/22 (!) 162 kg (357 lb 5.9 oz)   07/22/22 (!) 154 kg (340 lb)   11/16/21 (!) 156 kg (344 lb)   01/15/21 (!) 156 kg (344 lb 6.4 oz)   12/07/20 (!) 158 kg (347 lb 12.8 oz)   11/20/20 (!) 160 kg (352 lb 12.8 oz)   09/17/20 (!) 161 kg (354 lb 12.8 oz)   09/03/20 (!) 159 kg (351 lb 3.2 oz)   08/04/20 (!) 159 kg (351 lb)   10/24/19 (!) 147 kg (324 lb)   10/02/19 (!) 147 kg (324 lb 6.4 oz)   01/01/19 134 kg (295 lb)   10/20/18 (!) 138 kg (305 lb)   05/22/17 135 kg (298 lb)   06/21/16 127 kg (279 lb)       Patient presents today to medical weight management office for consult.      Starting MWM weight:  352 lbs (04/22/2025)  Starting BMI: 58.58 (04/22/2025)  Goal weight: healthy BMI      Obesity/Excess Weight:  Severity: Very Severe  Onset: entire life     Modifiers: Diet and Exercise  Contributing factors: Poor Food Choices, Insufficient Physical Activity, Stress/Emotional Eating, Lack of knowledge of appropriate lifestyle changes, and Insufficient time to make appropriate lifestyle changes  Associated symptoms: comorbid conditions, fatigue, increased joint pain, decreased exercise capacity, body image issues, decreased self esteem, increased shortness of breath, inability to do certain activities, and clothes do not fit        Weight History  Highest adult weight:: (Patient-Rptd) (P) 370 lbs  Lowest adult weight:: (Patient-Rptd) (P) 270 lbs  What is your goal weight?: (Patient-Rptd) (P) 200 lbs  How long have you struggled with maintaining a healthy weight?: (Patient-Rptd) (P) Childhood  What weight loss attempts have you had in the past?: (Patient-Rptd) (P) Diet, Exercise    Food Behaviors  Do you have any of the following food related behaviors?: (Patient-Rptd) (P) Cravings  Is there a trouble area of the day when these behaviors are more prominent?: (Patient-Rptd) (P) No    Food History  Provide the time that you typically eat and common foods you eat for each meal/snack: : (Patient-Rptd) (P) Breakfast, Lunch, Dinner, Snack(s)  Provide the time and common foods you eat for breakfast:: (Patient-Rptd) (P) Usually 6am depending on when I work eggs, cottage cheese, sweet potato steamed, protien shake with [employer] oats squares  Provide the time and common foods you eat for lunch:: (Patient-Rptd) (P) Sourdough with red pepper hummus, cucumber, avacado, sweet potato sometimes quinoa, pretzels, carrots, greek yogurt/frozen blueberries  Provide the time and common foods you eat for : (Patient-Rptd) (P) Eat meati(a vegetarian meat) broccoli, quinoa, spinach, green beans, spaghetti, rice  Provide the time(s) and common  foods you eat for a snack:: (Patient-Rptd) (P) Hummus and pretzels, snack cheese( chedder) brie bite, unsweetened applesauce  How often do you go out to eat or have take out?: (Patient-Rptd) (P) Not very often maybe 3 times a month  Daily beverage intake, please select the beverages you consume daily and the amount(s):: (Patient-Rptd) (P) Water, Coffee  How many cups of water?: (Patient-Rptd) (P) 12  How many cups of coffee?: (Patient-Rptd) (P) 1  Do you consume alcohol?: (Patient-Rptd) (P) No    Physical Activity   How often do you exercise?: (Patient-Rptd) (P) Im pretty active at work im constantly walking, and then I walk on mu walking pad three times a week.  How long are your activity sessions?: (Patient-Rptd) (P) 30 mins  What types of physical activity are you currently participating in?: (Patient-Rptd) (P) Cardio (elliptical, walking, running, biking, rowing, etc)    Sleep  How many hours of sleep are you getting per night?: (Patient-Rptd) (P) 8  How would you rate your quality of sleep?: (Patient-Rptd) (P) Good  Do you have a history of sleep apnea?: (Patient-Rptd) (P) No    Family/Social History  Do you have a family history of obesity?: (Patient-Rptd) (P) Yes  Who in your family?: (Patient-Rptd) (P) My grandpa, my mom    Gynecological History  If of childbearing age, are you using birth control?: (Patient-Rptd) (P) No  Menopausal status?: (Patient-Rptd) (P) N/A    Occupation: works at [employer]   Sleep: 7-8 hrs   STOP bang: 3/8    Colonoscopy: N/A  Mammogram: N/A    Patient denies personal history of pancreatitis. Patient also denies personal and family history of medullary thyroid cancer and multiple endocrine neoplasia type 2 (MEN 2 tumor). Increased risk for the aforementioned disease processes as well as those included in the medication monograph, associated with taking GLP-1 medication discussed. Patient acknowledges and would like to proceed with treatment. Patient also denies pregnancy. Expresses  "understanding that medication is not safe in pregnancy and will discontinue in the event of pregnancy. Patient demonstrates full understanding verbally. All questions answered.       Patient understands that it can take between 7-21 BUSINESS DAYS for a prior authorization to be initiated by the office and an additional 7-21 BUSINESS DAYS for the insurance company to provide a decision. If the medication is denied by an insurance plan exclusion, the office WILL NOT appeal the medication.        The following portions of the patient's history were reviewed and updated as appropriate: allergies, current medications, past family history, past medical history, past social history, past surgical history, and problem list.    Family History   Problem Relation Age of Onset    Hypertension Mother     Thyroid disease Father     Depression Father     Depression Brother     Breast cancer Maternal Grandmother     Liver cancer Maternal Grandmother     Diabetes Neg Hx     Heart disease Neg Hx     Stroke Neg Hx     Colon cancer Neg Hx     Ovarian cancer Neg Hx         Review of Systems   Constitutional:  Negative for chills and fever.   HENT:  Negative for ear pain and sore throat.    Eyes:  Negative for pain and visual disturbance.   Respiratory:  Negative for cough and shortness of breath.    Cardiovascular:  Negative for chest pain and palpitations.   Gastrointestinal:  Negative for abdominal pain and vomiting.   Genitourinary:  Negative for dysuria and hematuria.   Musculoskeletal:  Negative for arthralgias and back pain.   Skin:  Negative for color change and rash.   Neurological:  Negative for seizures and syncope.   All other systems reviewed and are negative.      Objective:  /98 (BP Location: Left arm, Patient Position: Sitting, Cuff Size: Large)   Pulse 84   Ht 5' 5\" (1.651 m)   Wt (!) 160 kg (352 lb)   LMP  (LMP Unknown) Comment: irregular periods  SpO2 96%   BMI 58.58 kg/m²     Physical Exam  Constitutional: "       General: She is not in acute distress.     Appearance: Normal appearance. She is obese. She is not ill-appearing, toxic-appearing or diaphoretic.   HENT:      Head: Normocephalic and atraumatic.   Pulmonary:      Effort: Pulmonary effort is normal.   Musculoskeletal:         General: Normal range of motion.      Cervical back: Normal range of motion.   Skin:     General: Skin is warm.   Neurological:      Mental Status: She is alert and oriented to person, place, and time.   Psychiatric:         Mood and Affect: Mood normal.         Behavior: Behavior normal.              Labs and Imaging  Recent labs and imaging have been personally reviewed.  Lab Results   Component Value Date    WBC 9.71 01/28/2025    HGB 12.3 01/28/2025    HCT 39.2 01/28/2025    MCV 87 01/28/2025     01/28/2025     Lab Results   Component Value Date    SODIUM 140 01/14/2021    K 3.9 01/14/2021     01/14/2021    CO2 26 01/14/2021    AGAP 10 01/14/2021    BUN 9 01/14/2021    CREATININE 1.16 01/14/2021    GLUC 96 08/05/2020    GLUF 101 (H) 01/14/2021    CALCIUM 8.7 01/14/2021    AST 22 01/14/2021    ALT 31 01/14/2021    ALKPHOS 69 01/14/2021    TP 7.8 01/14/2021    TBILI 0.50 01/14/2021    EGFR 66 01/14/2021     Lab Results   Component Value Date    HGBA1C 5.9 (H) 04/08/2025     Lab Results   Component Value Date    VKQ6DRXDUKKC 3.553 01/28/2025    TSH 7.50 (H) 08/05/2020     Lab Results   Component Value Date    CHOLESTEROL 181 04/08/2025     Lab Results   Component Value Date    HDL 47 (L) 04/08/2025     Lab Results   Component Value Date    TRIG 140 04/08/2025     Lab Results   Component Value Date    LDLCALC 106 (H) 04/08/2025           Weight Management  Alexandr Rosas PA-C

## 2025-04-22 NOTE — PATIENT INSTRUCTIONS
- Discussed options of Healthy[facility]-Intensive Lifestyle Intervention Program, Very Low Calorie Diet-VLCD, and Conservative Program and the role of weight loss medications.  - Patient is interested in pursuing Conservative Program and follow up visits with medical weight management provider.  - Explained the importance of making lifestyle changes in addition to starting any anti-obesity medications.   - Initial weight loss goal of 5-10% weight loss for improved health. Weight loss can improve patient's co-morbid conditions and/or prevent weight-related complications.  - Weight is not at goal and patient has been unable to achieve a meaningful weight loss above 5% using various programs and tools for more than 6 months  - Labs reviewed.     General Recommendations:  Nutrition:  Eat breakfast daily.  Do not skip meals.      Food log (ie.) www.seniorshelf.com.com, sparkpeople.com, loseit.com, calorieking.com, etc.     Practice mindful eating.  Be sure to set aside time to eat, eat slowly, and savor your food.     Hydration:    At least 64oz of water daily.  No sugar sweetened beverages.  No juice (eat the fruit instead).     Exercise:  Studies have shown that the ideal exercise goal is somewhere between 150 to 300 minutes of moderate intensity exercise a week.  Start with exercising 10 minutes every other day and gradually increase physical activity with a goal of at least 150 minutes of moderate intensity exercise a week, divided over at least 3 days a week.  An example of this would be exercising 30 minutes a day, 5 days a week.  Resistance training can increase muscle mass and increase our resting metabolic rate.   FULL BODY resistance training is recommended 2-3 times a week.  Do not do this on consecutive days to allow for muscle recovery.     Aim for a bare minimum 5000 steps, even on days you do not exercise.     Monitoring:   Weigh yourself daily.  If this causes undue stress, then just weigh yourself once a week.   Weigh yourself the same time of the day with the same amount of clothing on.  Preferably this should be done after waking up, before you eat, and with no clothing or minimal clothing on.     Specific Goals:  Calorie goal:  1500 veronica/day (Provided with meal plan to follow)    Zepbound Instructions:    - Begin Zepbound 2.5 mg subcutaneously once a week. Dose changes may occur after 4 doses if medication is tolerated. You will be assessed prior to each dose change to make sure you are tolerating the medication well.  - Please message me when you have 2 pens left from the prescription so there are no lapses in treatment.  - If you have been off the medication for more than 14 days please contact the office as you will need to restart the titration at the starting dose again to avoid significant side effects or adverse events.  - Visit Zepbound.com for further information/injection instructions.   -Please eat small frequent meals to help reduce nausea. Lemon water and saltine crackers may help with this.   - Side effects of Zepbound discussed: nausea, vomiting, diarrhea, and constipation. If you experience fever, nausea/vomiting, and pain radiating to your back this may be a sign of pancreatitis. Please go to the emergency room if this occurs.  - If on oral birth control a 2nd method of birth control is recommended during the 1st 8 weeks of therapy and for 4 weeks after any dosage change.   - Patient understands the side effects of the medication and proper administration. Patient agrees with the treatment plan and all questions were answered.    - Side effects of Zepbound: nausea, vomiting, diarrhea, and constipation. If severe abdominal pain develops, stop Zepbound and go to the ER, as this could be pancreatitis. Monitor heart rate while on Zepbound and if resting heart rate greater than 100 beats per minute, patient will notify me.   - If you need to have surgery or another procedure, such as an upper endoscopy or  colonoscopy, please contact my office as often medications like Zepbound need to be held for a certain amount of time prior to a procedure.       GLP-1 Managing Side Effects Tips:  - focus on small frequent meals  - moderate sugar and fat intake  - change the injection site (abdomen --> thigh--> back of arm)  - eat protein, crackers, mints and haylee-based drinks  - nighttime injections  - drink plenty of water  - consider fiber supplements like miralax     Tips for Nausea:  - Don't drink and eat simultaneously: separate fluids 30-60 minutes before and after meals when experiencing nausea or if you notice you become full quickly  - Choose mild smelling foods- strong smells can exacerbate nausea  - Haylee and peppermint- consider drinking haylee or peppermint tea, which can help alleviate symptoms  - Eat- don't skip meals, if you have nausea, it is understandable that you may not feel like eating. However, skipping meals is generally not recommended as this can lead to lower blood sugar and fatigue. Furthermore, it is essential to consume adequate protein during weight loss. You can opt for a protein shake, a meal replacement supplement, bone broth or soup.      Tips for Constipation:   - Drink plenty of water  - Eat food with a high fiber content: increase your fiber intake by consuming these types of foods:              Eat more whole grain products like barley, oats, farro, brown or wild rice and quinoa.               Look for choices with 100% whole wheat, rye, oats or bran as the first ingredient listed               Check nutrition fact labels and choose products with 4gm of dietary fiber or more per serving              Add more beans to your diet              Eat more fresh fruits and vegetables with skins on them               Exercise- increase physical activity as it helps stimulate gastric mobility, which moves stool through the digestive tract     Patient denies personal history of pancreatitis. Patient  also denies personal and family history of medullary thyroid cancer and multiple endocrine neoplasia type 2 (MEN 2 tumor). Increased risk for the aforementioned disease processes as well as those included in the medication monograph, associated with taking GLP-1 medication discussed. Patient acknowledges and would like to proceed with treatment. Patient also denies pregnancy. Expresses understanding that medication is not safe in pregnancy and will discontinue in the event of pregnancy. Patient demonstrates full understanding verbally. All questions answered.       Patient understands the side effects of the medication and proper administration. Patient agrees with the treatment plan and all questions were answered.    Patient understands that it can take between 7-21 BUSINESS DAYS for a prior authorization to be initiated by the office and an additional 7-21 BUSINESS DAYS for the insurance company to provide a decision. If the medication is denied by an insurance plan exclusion, the office WILL NOT appeal the medication.

## 2025-04-23 ENCOUNTER — TELEPHONE (OUTPATIENT)
Dept: BARIATRICS | Facility: CLINIC | Age: 30
End: 2025-04-23

## 2025-05-07 ENCOUNTER — APPOINTMENT (EMERGENCY)
Dept: RADIOLOGY | Facility: HOSPITAL | Age: 30
End: 2025-05-07
Payer: COMMERCIAL

## 2025-05-07 ENCOUNTER — HOSPITAL ENCOUNTER (EMERGENCY)
Facility: HOSPITAL | Age: 30
Discharge: HOME | End: 2025-05-07
Attending: EMERGENCY MEDICINE
Payer: COMMERCIAL

## 2025-05-07 VITALS
OXYGEN SATURATION: 100 % | DIASTOLIC BLOOD PRESSURE: 83 MMHG | HEART RATE: 87 BPM | RESPIRATION RATE: 18 BRPM | TEMPERATURE: 97.9 F | SYSTOLIC BLOOD PRESSURE: 198 MMHG

## 2025-05-07 DIAGNOSIS — M77.52 TENDONITIS OF ANKLE, LEFT: ICD-10-CM

## 2025-05-07 DIAGNOSIS — M25.572 LEFT ANKLE PAIN: Primary | ICD-10-CM

## 2025-05-07 DIAGNOSIS — S93.402A LEFT ANKLE SPRAIN: ICD-10-CM

## 2025-05-07 PROCEDURE — 73610 X-RAY EXAM OF ANKLE: CPT

## 2025-05-07 PROCEDURE — 99283 EMERGENCY DEPT VISIT LOW MDM: CPT

## 2025-05-07 PROCEDURE — 96372 THER/PROPH/DIAG INJ SC/IM: CPT

## 2025-05-07 PROCEDURE — 99284 EMERGENCY DEPT VISIT MOD MDM: CPT

## 2025-05-07 RX ORDER — KETOROLAC TROMETHAMINE 30 MG/ML
15 INJECTION, SOLUTION INTRAMUSCULAR; INTRAVENOUS ONCE
Status: COMPLETED | OUTPATIENT
Start: 2025-05-07 | End: 2025-05-07

## 2025-05-07 RX ORDER — LIDOCAINE 50 MG/G
1 PATCH TOPICAL ONCE
Status: DISCONTINUED | OUTPATIENT
Start: 2025-05-07 | End: 2025-05-07 | Stop reason: HOSPADM

## 2025-05-07 RX ORDER — IBUPROFEN 600 MG/1
600 TABLET, FILM COATED ORAL EVERY 6 HOURS PRN
Qty: 20 TABLET | Refills: 0 | Status: SHIPPED | OUTPATIENT
Start: 2025-05-07 | End: 2025-05-12

## 2025-05-07 RX ORDER — LIDOCAINE 50 MG/G
1 PATCH TOPICAL DAILY
Qty: 5 PATCH | Refills: 0 | Status: SHIPPED | OUTPATIENT
Start: 2025-05-07 | End: 2025-05-12

## 2025-05-07 RX ADMIN — KETOROLAC TROMETHAMINE 15 MG: 30 INJECTION, SOLUTION INTRAMUSCULAR at 02:52

## 2025-05-07 RX ADMIN — LIDOCAINE 1 PATCH: 700 PATCH TOPICAL at 02:52

## 2025-05-07 NOTE — ED PROVIDER NOTES
Time reflects when diagnosis was documented in both MDM as applicable and the Disposition within this note       Time User Action Codes Description Comment    5/7/2025  2:44 AM Abiola Genao [M25.572] Left ankle pain     5/7/2025  2:44 AM Abiola Genao [S93.402A] Left ankle sprain     5/7/2025  2:44 AM Birgit Abiola Alvarez [M77.52] Tendonitis of ankle, left           ED Disposition       ED Disposition   Discharge    Condition   Stable    Date/Time   Wed May 7, 2025  2:44 AM    Comment   Chrissy Mazariegos discharge to home/self care.                   Assessment & Plan       Medical Decision Making  Differential diagnosis includes but is not limited to fracture, sprain, musculoskeletal injury, tendinitis, abrasion, laceration, etc.    Patient well-appearing throughout ER course.  Physical exam consistent with ankle sprain versus tendinitis due to questionable mis-stepping/rolled injury.  Patient given medication for symptoms while in the ER.  X-rays unremarkable per my interpretation.  Patient to follow-up with orthopedics, referral placed.  Patient to follow with her PCP and return to the ER for any worsening symptoms.  All questions and concerns addressed and answered appropriately.  Patient verbalizes understanding and agrees to discharge plan.  Patient also provided with written discharge instructions.    Amount and/or Complexity of Data Reviewed  Radiology: ordered and independent interpretation performed.    Risk  Prescription drug management.             Medications   lidocaine (LIDODERM) 5 % patch 1 patch (1 patch Topical Medication Applied 5/7/25 0252)   ketorolac (TORADOL) injection 15 mg (15 mg Intramuscular Given 5/7/25 0252)       ED Risk Strat Scores                    No data recorded                            History of Present Illness       Chief Complaint   Patient presents with    Ankle Pain     Pt arrives c/o L ankle pain and swelling. Pt denies injury/ trauma.        Past Medical History:    Diagnosis Date    Anemia     Anxiety     Disease of thyroid gland     hypothyroid    Hashimoto's disease     Morbid obesity with BMI of 50.0-59.9, adult (HCC)     PCOS (polycystic ovarian syndrome)     Thyroid disease     Tinea corporis 06/21/2016      Past Surgical History:   Procedure Laterality Date    PILONIDAL CYST EXCISION        Family History   Problem Relation Age of Onset    Hypertension Mother     Thyroid disease Father     Depression Father     Depression Brother     Breast cancer Maternal Grandmother     Liver cancer Maternal Grandmother     Diabetes Neg Hx     Heart disease Neg Hx     Stroke Neg Hx     Colon cancer Neg Hx     Ovarian cancer Neg Hx       Social History     Tobacco Use    Smoking status: Never    Smokeless tobacco: Never   Vaping Use    Vaping status: Never Used   Substance Use Topics    Alcohol use: Yes     Comment: rarely    Drug use: Not Currently     Types: Marijuana     Comment: history      E-Cigarette/Vaping    E-Cigarette Use Never User       E-Cigarette/Vaping Substances    Nicotine No     THC No     CBD No     Flavoring No     Other No     Unknown No       I have reviewed and agree with the history as documented.     Patient is a 30-year-old female who presents to the emergency room for left ankle pain.  Patient reports left lateral ankle pain for x2-3 days.  Patient thinks she may have missed stepped and rolled her left ankle.  Patient works at [employer] and is always on her feet.  Otherwise, denies trauma or injury.  Denies any other symptoms.      Ankle Pain  Associated symptoms: no back pain and no fever        Review of Systems   Constitutional:  Negative for chills and fever.   HENT:  Negative for ear pain, sore throat, trouble swallowing and voice change.    Eyes:  Negative for pain and visual disturbance.   Respiratory:  Negative for cough and shortness of breath.    Cardiovascular:  Negative for chest pain and palpitations.   Gastrointestinal:  Negative for abdominal  pain, nausea and vomiting.   Genitourinary:  Negative for dysuria, flank pain and hematuria.   Musculoskeletal:  Positive for arthralgias (Left ankle). Negative for back pain and gait problem.   Skin:  Negative for color change and rash.   Neurological:  Negative for seizures, syncope and headaches.   Psychiatric/Behavioral:  Negative for confusion.    All other systems reviewed and are negative.          Objective       ED Triage Vitals   Temperature Pulse Blood Pressure Respirations SpO2 Patient Position - Orthostatic VS   05/07/25 0206 05/07/25 0206 05/07/25 0206 05/07/25 0206 05/07/25 0206 --   97.9 °F (36.6 °C) 98 (!) 206/96 18 99 %       Temp Source Heart Rate Source BP Location FiO2 (%) Pain Score    05/07/25 0206 05/07/25 0206 -- -- 05/07/25 0252    Oral Monitor   10 - Worst Possible Pain      Vitals      Date and Time Temp Pulse SpO2 Resp BP Pain Score FACES Pain Rating User   05/07/25 0252 -- -- -- -- -- 10 - Worst Possible Pain -- KW   05/07/25 0208 -- 87 100 % -- 198/83 -- -- KW   05/07/25 0206 97.9 °F (36.6 °C) 98 99 % 18 206/96 -- -- ROHINI            Physical Exam  Vitals and nursing note reviewed.   Constitutional:       General: She is not in acute distress.     Appearance: Normal appearance. She is well-developed.   HENT:      Head: Normocephalic and atraumatic.   Eyes:      Conjunctiva/sclera: Conjunctivae normal.   Cardiovascular:      Rate and Rhythm: Normal rate and regular rhythm.      Pulses: Normal pulses.      Heart sounds: No murmur heard.  Pulmonary:      Effort: Pulmonary effort is normal. No respiratory distress.      Breath sounds: Normal breath sounds.   Abdominal:      Palpations: Abdomen is soft.      Tenderness: There is no abdominal tenderness. There is no right CVA tenderness or left CVA tenderness.   Musculoskeletal:         General: No swelling.      Cervical back: Normal range of motion and neck supple.        Legs:    Skin:     General: Skin is warm and dry.      Capillary  Refill: Capillary refill takes less than 2 seconds.   Neurological:      General: No focal deficit present.      Mental Status: She is alert and oriented to person, place, and time.   Psychiatric:         Mood and Affect: Mood normal.         Results Reviewed       None            XR ankle 3+ views LEFT   ED Interpretation by Abiola Genao PA-C (05/07 0243)   No acute osseous abnormality          Procedures    ED Medication and Procedure Management   Prior to Admission Medications   Prescriptions Last Dose Informant Patient Reported? Taking?   levothyroxine 150 mcg tablet   No No   Sig: TAKE ONE TABLET BY MOUTH EVERY DAY IN EARLY MORNING   tirzepatide (Zepbound) 2.5 mg/0.5 mL auto-injector   No No   Sig: Inject 0.5 mL (2.5 mg total) under the skin once a week for 28 days      Facility-Administered Medications Last Administration Doses Remaining   nystatin (MYCOSTATIN) powder None recorded         Patient's Medications   Discharge Prescriptions    IBUPROFEN (MOTRIN) 600 MG TABLET    Take 1 tablet (600 mg total) by mouth every 6 (six) hours as needed for mild pain or moderate pain for up to 5 days       Start Date: 5/7/2025  End Date: 5/12/2025       Order Dose: 600 mg       Quantity: 20 tablet    Refills: 0    LIDOCAINE (LIDODERM) 5 %    Apply 1 patch topically over 12 hours daily for 5 days Remove & Discard patch within 12 hours or as directed by MD       Start Date: 5/7/2025  End Date: 5/12/2025       Order Dose: 1 patch       Quantity: 5 patch    Refills: 0       ED SEPSIS DOCUMENTATION   Time reflects when diagnosis was documented in both MDM as applicable and the Disposition within this note       Time User Action Codes Description Comment    5/7/2025  2:44 AM Abiola Genao [M25.572] Left ankle pain     5/7/2025  2:44 AM Abiola Genao [S93.402A] Left ankle sprain     5/7/2025  2:44 AM Abiola Genao [M77.52] Tendonitis of ankle, left                  Abiola Genao PA-C  05/07/25 0256

## 2025-05-07 NOTE — DISCHARGE INSTRUCTIONS
Ibuprofen as prescribed.  You can also take Tylenol.  Lidocaine patches as needed.  Ace wrap, ice, elevation.    Follow-up with orthopedics, referral placed.  Follow-up with your PCP and return to the ER for any worsening symptoms.

## 2025-05-07 NOTE — Clinical Note
Chrissy Mazariegos was seen and treated in our emergency department on 5/7/2025.                Diagnosis:     Chrissy  may return to work on return date.    She may return on this date: 05/08/2025         If you have any questions or concerns, please don't hesitate to call.      Maren Dumont RN    ______________________________           _______________          _______________  Hospital Representative                              Date                                Time

## 2025-05-19 ENCOUNTER — TELEPHONE (OUTPATIENT)
Dept: BARIATRICS | Facility: CLINIC | Age: 30
End: 2025-05-19

## 2025-05-19 DIAGNOSIS — E28.2 PCOS (POLYCYSTIC OVARIAN SYNDROME): ICD-10-CM

## 2025-05-19 DIAGNOSIS — R73.03 PREDIABETES: ICD-10-CM

## 2025-05-19 DIAGNOSIS — E06.3 HYPOTHYROIDISM DUE TO HASHIMOTO'S THYROIDITIS: ICD-10-CM

## 2025-05-19 DIAGNOSIS — E66.01 MORBID OBESITY WITH BMI OF 50.0-59.9, ADULT (HCC): Primary | ICD-10-CM

## 2025-05-19 RX ORDER — TIRZEPATIDE 5 MG/.5ML
5 INJECTION, SOLUTION SUBCUTANEOUS WEEKLY
Qty: 2 ML | Refills: 3 | Status: SHIPPED | OUTPATIENT
Start: 2025-05-19 | End: 2025-09-08

## 2025-05-19 NOTE — TELEPHONE ENCOUNTER
Patient called the RX Refill Line. Message is being forwarded to the office.     Patient is requesting a refill on Zepbound and uncertain if she will be increasing in dosage. Patient has two weeks supply but wanted to call early incase the higher strength would require any authorization.    Please contact patient at [phone] with any questions or concerns          How are you tolerating the medication?   [x] Nausea   slight nausea first day following injection  [] Vomiting  [] Diarrhea  [] Asymptomatic  [] Other:    Last visit weight:352lbs 4/22/25    Current weight: 342lbs    Date of last injection:5/15/25    How many injections do you have left: Two

## 2025-05-31 DIAGNOSIS — E06.3 HYPOTHYROIDISM DUE TO HASHIMOTO'S THYROIDITIS: ICD-10-CM

## 2025-06-01 RX ORDER — LEVOTHYROXINE SODIUM 150 UG/1
TABLET ORAL
Qty: 30 TABLET | Refills: 4 | Status: SHIPPED | OUTPATIENT
Start: 2025-06-01

## 2025-07-25 ENCOUNTER — OFFICE VISIT (OUTPATIENT)
Dept: BARIATRICS | Facility: CLINIC | Age: 30
End: 2025-07-25
Payer: COMMERCIAL

## 2025-07-25 VITALS
RESPIRATION RATE: 16 BRPM | HEIGHT: 65 IN | WEIGHT: 293 LBS | SYSTOLIC BLOOD PRESSURE: 126 MMHG | BODY MASS INDEX: 48.82 KG/M2 | HEART RATE: 71 BPM | DIASTOLIC BLOOD PRESSURE: 84 MMHG

## 2025-07-25 DIAGNOSIS — E28.2 PCOS (POLYCYSTIC OVARIAN SYNDROME): ICD-10-CM

## 2025-07-25 DIAGNOSIS — E66.01 MORBID OBESITY WITH BMI OF 50.0-59.9, ADULT (HCC): Primary | ICD-10-CM

## 2025-07-25 DIAGNOSIS — E06.3 HYPOTHYROIDISM DUE TO HASHIMOTO'S THYROIDITIS: ICD-10-CM

## 2025-07-25 DIAGNOSIS — R73.03 PREDIABETES: ICD-10-CM

## 2025-07-25 PROCEDURE — 99214 OFFICE O/P EST MOD 30 MIN: CPT | Performed by: PHYSICAL THERAPIST

## 2025-07-25 RX ORDER — TIRZEPATIDE 7.5 MG/.5ML
7.5 INJECTION, SOLUTION SUBCUTANEOUS WEEKLY
Qty: 2 ML | Refills: 2 | Status: SHIPPED | OUTPATIENT
Start: 2025-07-25 | End: 2025-10-17

## 2025-07-25 NOTE — PATIENT INSTRUCTIONS
- Increase to Zepbound 7.5 mg.   - Patient is pursuing Conservative Program and follow up visits with medical weight management provider  - Initial weight loss goal of 5-10% weight loss for improved health. Weight loss can improve patient's co-morbid conditions and/or prevent weight-related complications.  - Explained the importance of continuing lifestyle changes in addition to any anti-obesity medications.   - Labs reviewed.     General Recommendations:  Nutrition:  Eat breakfast daily.  Do not skip meals.      Food log (ie.) www.myfitnesspal.com, sparkpeople.com, loseit.com, calorieking.com, etc.     Practice mindful eating.  Be sure to set aside time to eat, eat slowly, and savor your food.     Hydration:    At least 64oz of water daily.  No sugar sweetened beverages.  No juice (eat the fruit instead).     Exercise:  Studies have shown that the ideal exercise goal is somewhere between 150 to 300 minutes of moderate intensity exercise a week.  Start with exercising 10 minutes every other day and gradually increase physical activity with a goal of at least 150 minutes of moderate intensity exercise a week, divided over at least 3 days a week.  An example of this would be exercising 30 minutes a day, 5 days a week.  Resistance training can increase muscle mass and increase our resting metabolic rate.   FULL BODY resistance training is recommended 2-3 times a week.  Do not do this on consecutive days to allow for muscle recovery.     Aim for a bare minimum 5000 steps, even on days you do not exercise.     Monitoring:   Weigh yourself daily.  If this causes undue stress, then just weigh yourself once a week.  Weigh yourself the same time of the day with the same amount of clothing on.  Preferably this should be done after waking up, before you eat, and with no clothing or minimal clothing on.     Specific Goals:  Calorie goal:  1500 veronica/day (Provided with meal plan to follow)     - Side effects of Zepbound: nausea,  vomiting, diarrhea, and constipation. If severe abdominal pain develops, stop Zepbound and go to the ER, as this could be pancreatitis. Monitor heart rate while on Zepbound and if resting heart rate greater than 100 beats per minute, patient will notify me.   - If you need to have surgery or another procedure, such as an upper endoscopy or colonoscopy, please contact my office as often medications like Zepbound need to be held for a certain amount of time prior to a procedure.       GLP-1 Managing Side Effects Tips:  - focus on small frequent meals  - moderate sugar and fat intake  - change the injection site (abdomen --> thigh--> back of arm)  - eat protein, crackers, mints and haylee-based drinks  - nighttime injections  - drink plenty of water  - consider fiber supplements like miralax     Tips for Nausea:  - Don't drink and eat simultaneously: separate fluids 30-60 minutes before and after meals when experiencing nausea or if you notice you become full quickly  - Choose mild smelling foods- strong smells can exacerbate nausea  - Haylee and peppermint- consider drinking haylee or peppermint tea, which can help alleviate symptoms  - Eat- don't skip meals, if you have nausea, it is understandable that you may not feel like eating. However, skipping meals is generally not recommended as this can lead to lower blood sugar and fatigue. Furthermore, it is essential to consume adequate protein during weight loss. You can opt for a protein shake, a meal replacement supplement, bone broth or soup.      Tips for Constipation:   - Drink plenty of water  - Eat food with a high fiber content: increase your fiber intake by consuming these types of foods:              Eat more whole grain products like barley, oats, farro, brown or wild rice and quinoa.               Look for choices with 100% whole wheat, rye, oats or bran as the first ingredient listed               Check nutrition fact labels and choose products with 4gm of  dietary fiber or more per serving              Add more beans to your diet              Eat more fresh fruits and vegetables with skins on them               Exercise- increase physical activity as it helps stimulate gastric mobility, which moves stool through the digestive tract

## 2025-07-25 NOTE — PROGRESS NOTES
Assessment/Plan:     Morbid obesity with BMI of 50.0-59.9, adult (HCC)  - Increase to Zepbound 7.5 mg.   - Patient is pursuing Conservative Program and follow up visits with medical weight management provider  - Initial weight loss goal of 5-10% weight loss for improved health. Weight loss can improve patient's co-morbid conditions and/or prevent weight-related complications.  - Explained the importance of continuing lifestyle changes in addition to any anti-obesity medications.   - Labs reviewed.     General Recommendations:  Nutrition:  Eat breakfast daily.  Do not skip meals.      Food log (ie.) www.myfitnesspal.com, sparkpeople.com, loseit.com, calorieking.com, etc.     Practice mindful eating.  Be sure to set aside time to eat, eat slowly, and savor your food.     Hydration:    At least 64oz of water daily.  No sugar sweetened beverages.  No juice (eat the fruit instead).     Exercise:  Studies have shown that the ideal exercise goal is somewhere between 150 to 300 minutes of moderate intensity exercise a week.  Start with exercising 10 minutes every other day and gradually increase physical activity with a goal of at least 150 minutes of moderate intensity exercise a week, divided over at least 3 days a week.  An example of this would be exercising 30 minutes a day, 5 days a week.  Resistance training can increase muscle mass and increase our resting metabolic rate.   FULL BODY resistance training is recommended 2-3 times a week.  Do not do this on consecutive days to allow for muscle recovery.     Aim for a bare minimum 5000 steps, even on days you do not exercise.     Monitoring:   Weigh yourself daily.  If this causes undue stress, then just weigh yourself once a week.  Weigh yourself the same time of the day with the same amount of clothing on.  Preferably this should be done after waking up, before you eat, and with no clothing or minimal clothing on.     Specific Goals:  Calorie goal:  1500 veronica/day  (Provided with meal plan to follow)     - Side effects of Zepbound: nausea, vomiting, diarrhea, and constipation. If severe abdominal pain develops, stop Zepbound and go to the ER, as this could be pancreatitis. Monitor heart rate while on Zepbound and if resting heart rate greater than 100 beats per minute, patient will notify me.   - If you need to have surgery or another procedure, such as an upper endoscopy or colonoscopy, please contact my office as often medications like Zepbound need to be held for a certain amount of time prior to a procedure.       GLP-1 Managing Side Effects Tips:  - focus on small frequent meals  - moderate sugar and fat intake  - change the injection site (abdomen --> thigh--> back of arm)  - eat protein, crackers, mints and haylee-based drinks  - nighttime injections  - drink plenty of water  - consider fiber supplements like miralax     Tips for Nausea:  - Don't drink and eat simultaneously: separate fluids 30-60 minutes before and after meals when experiencing nausea or if you notice you become full quickly  - Choose mild smelling foods- strong smells can exacerbate nausea  - Haylee and peppermint- consider drinking haylee or peppermint tea, which can help alleviate symptoms  - Eat- don't skip meals, if you have nausea, it is understandable that you may not feel like eating. However, skipping meals is generally not recommended as this can lead to lower blood sugar and fatigue. Furthermore, it is essential to consume adequate protein during weight loss. You can opt for a protein shake, a meal replacement supplement, bone broth or soup.      Tips for Constipation:   - Drink plenty of water  - Eat food with a high fiber content: increase your fiber intake by consuming these types of foods:              Eat more whole grain products like barley, oats, farro, brown or wild rice and quinoa.               Look for choices with 100% whole wheat, rye, oats or bran as the first ingredient  listed               Check nutrition fact labels and choose products with 4gm of dietary fiber or more per serving              Add more beans to your diet              Eat more fresh fruits and vegetables with skins on them               Exercise- increase physical activity as it helps stimulate gastric mobility, which moves stool through the digestive tract          Chrissy was seen today for follow-up.    Diagnoses and all orders for this visit:    Morbid obesity with BMI of 50.0-59.9, adult (HCC)  -     tirzepatide (Zepbound) 7.5 mg/0.5 mL auto-injector; Inject 0.5 mL (7.5 mg total) under the skin once a week    PCOS (polycystic ovarian syndrome)  -     tirzepatide (Zepbound) 7.5 mg/0.5 mL auto-injector; Inject 0.5 mL (7.5 mg total) under the skin once a week    Hypothyroidism due to Hashimoto's thyroiditis  -     tirzepatide (Zepbound) 7.5 mg/0.5 mL auto-injector; Inject 0.5 mL (7.5 mg total) under the skin once a week    Prediabetes  -     tirzepatide (Zepbound) 7.5 mg/0.5 mL auto-injector; Inject 0.5 mL (7.5 mg total) under the skin once a week        Total time spent reviewing chart, interviewing patient, examining patient, discussing plan, answering all questions, and documentin minutes with >50% face-to-face time with the patient.    Follow up in approximately 3 months with Non-Surgical Physician/Advanced Practitioner.    Subjective:   Chief Complaint   Patient presents with    Follow-up       Patient ID: Chrissy Mazariegos  is a 30 y.o. female with excess weight/obesity here to pursue weight management.  Patient is pursuing Conservative Program. Doing great overall. Significant weight loss 30+ lbs since starting medication. No side effects. Would like to increase to 7.5 mg dose.    Most recent notes and records were reviewed.    HPI    Wt Readings from Last 20 Encounters:   25 (!) 144 kg (317 lb)   25 (!) 160 kg (352 lb)   25 (!) 161 kg (354 lb 3.2 oz)   25 (!) 161 kg (354 lb)    01/16/25 (!) 164 kg (361 lb)   01/13/25 (!) 162 kg (358 lb)   08/13/22 (!) 162 kg (357 lb 5.9 oz)   07/22/22 (!) 154 kg (340 lb)   11/16/21 (!) 156 kg (344 lb)   01/15/21 (!) 156 kg (344 lb 6.4 oz)   12/07/20 (!) 158 kg (347 lb 12.8 oz)   11/20/20 (!) 160 kg (352 lb 12.8 oz)   09/17/20 (!) 161 kg (354 lb 12.8 oz)   09/03/20 (!) 159 kg (351 lb 3.2 oz)   08/04/20 (!) 159 kg (351 lb)   10/24/19 (!) 147 kg (324 lb)   10/02/19 (!) 147 kg (324 lb 6.4 oz)   01/01/19 134 kg (295 lb)   10/20/18 (!) 138 kg (305 lb)   05/22/17 135 kg (298 lb)       Patient presents today to medical weight management office for follow up.    Medication: Zepbound 5 mg   Starting MWM weight: 352 lbs (04/22/2025)  Current Weight: 317 lbs (07/25/2025)  Difference: -35 lbs    Starting BMI: 58.58 (04/22/2025)  Current BMI: 52.75 (07/25/2025)  Goal weight: healthy BMI        Obesity/Excess Weight:  Severity: Very Severe  Onset: entire life     Modifiers: Diet and Exercise  Contributing factors: Poor Food Choices, Insufficient Physical Activity, Stress/Emotional Eating, Lack of knowledge of appropriate lifestyle changes, and Insufficient time to make appropriate lifestyle changes  Associated symptoms: comorbid conditions, fatigue, increased joint pain, decreased exercise capacity, body image issues, decreased self esteem, increased shortness of breath, inability to do certain activities, and clothes do not fit           Weight History  Highest adult weight:: (Patient-Rptd) (P) 370 lbs  Lowest adult weight:: (Patient-Rptd) (P) 270 lbs  What is your goal weight?: (Patient-Rptd) (P) 200 lbs  How long have you struggled with maintaining a healthy weight?: (Patient-Rptd) (P) Childhood  What weight loss attempts have you had in the past?: (Patient-Rptd) (P) Diet, Exercise     Food Behaviors  Do you have any of the following food related behaviors?: (Patient-Rptd) (P) Cravings  Is there a trouble area of the day when these behaviors are more prominent?:  (Patient-Rptd) (P) No     Food History  Provide the time that you typically eat and common foods you eat for each meal/snack: : (Patient-Rptd) (P) Breakfast, Lunch, Dinner, Snack(s)  Provide the time and common foods you eat for breakfast:: (Patient-Rptd) (P) Usually 6am depending on when I work eggs, cottage cheese, sweet potato steamed, protien shake with Mandaen oats squares  Provide the time and common foods you eat for lunch:: (Patient-Rptd) (P) Sourdough with red pepper hummus, cucumber, avacado, sweet potato sometimes quinoa, pretzels, carrots, greek yogurt/frozen blueberries  Provide the time and common foods you eat for : (Patient-Rptd) (P) Eat meati(a vegetarian meat) broccoli, quinoa, spinach, green beans, spaghetti, rice  Provide the time(s) and common foods you eat for a snack:: (Patient-Rptd) (P) Hummus and pretzels, snack cheese( chedder) brie bite, unsweetened applesauce  How often do you go out to eat or have take out?: (Patient-Rptd) (P) Not very often maybe 3 times a month  Daily beverage intake, please select the beverages you consume daily and the amount(s):: (Patient-Rptd) (P) Water, Coffee  How many cups of water?: (Patient-Rptd) (P) 12  How many cups of coffee?: (Patient-Rptd) (P) 1  Do you consume alcohol?: (Patient-Rptd) (P) No     Physical Activity   How often do you exercise?: (Patient-Rptd) (P) Im pretty active at work im constantly walking, and then I walk on mu walking pad three times a week.  How long are your activity sessions?: (Patient-Rptd) (P) 30 mins  What types of physical activity are you currently participating in?: (Patient-Rptd) (P) Cardio (elliptical, walking, running, biking, rowing, etc)     Sleep  How many hours of sleep are you getting per night?: (Patient-Rptd) (P) 8  How would you rate your quality of sleep?: (Patient-Rptd) (P) Good  Do you have a history of sleep apnea?: (Patient-Rptd) (P) No     Family/Social History  Do you have a family history of obesity?:  "(Patient-Rptd) (P) Yes  Who in your family?: (Patient-Rptd) (P) My grandpa, my mom     Gynecological History  If of childbearing age, are you using birth control?: (Patient-Rptd) (P) No  Menopausal status?: (Patient-Rptd) (P) N/A     Occupation: works at [employer]   Sleep: 7-8 hrs   STOP bang: 3/8     Colonoscopy: N/A  Mammogram: N/A      The following portions of the patient's history were reviewed and updated as appropriate: allergies, current medications, past family history, past medical history, past social history, past surgical history, and problem list.    Family History[1]     Review of Systems    Objective:  /84 (BP Location: Left arm, Patient Position: Sitting, Cuff Size: Large)   Pulse 71   Resp 16   Ht 5' 5\" (1.651 m)   Wt (!) 144 kg (317 lb)   BMI 52.75 kg/m²     Physical Exam       Labs   Most recent labs reviewed   Lab Results   Component Value Date    SODIUM 140 01/14/2021    K 3.9 01/14/2021     01/14/2021    CO2 26 01/14/2021    AGAP 10 01/14/2021    BUN 9 01/14/2021    CREATININE 1.16 01/14/2021    GLUC 96 08/05/2020    GLUF 101 (H) 01/14/2021    CALCIUM 8.7 01/14/2021    AST 22 01/14/2021    ALT 31 01/14/2021    ALKPHOS 69 01/14/2021    TP 7.8 01/14/2021    TBILI 0.50 01/14/2021    EGFR 66 01/14/2021     Lab Results   Component Value Date    HGBA1C 5.9 (H) 04/08/2025     Lab Results   Component Value Date    JAY5HPZBTMDM 3.553 01/28/2025    TSH 7.50 (H) 08/05/2020     Lab Results   Component Value Date    CHOLESTEROL 181 04/08/2025     Lab Results   Component Value Date    HDL 47 (L) 04/08/2025     Lab Results   Component Value Date    TRIG 140 04/08/2025     Lab Results   Component Value Date    LDLCALC 106 (H) 04/08/2025           Weight Management  Alexandr Rosas PA-C         [1]   Family History  Problem Relation Name Age of Onset    Hypertension Mother Madiha Mazariegos     Thyroid disease Father Valentin Mazariegos     Depression Father Valentin Mazariegos     Depression Brother      Breast " cancer Maternal Grandmother Camilla Akins     Liver cancer Maternal Grandmother Camilla Akins     Diabetes Neg Hx      Heart disease Neg Hx      Stroke Neg Hx      Colon cancer Neg Hx      Ovarian cancer Neg Hx